# Patient Record
Sex: MALE | Race: WHITE | NOT HISPANIC OR LATINO | Employment: FULL TIME | URBAN - METROPOLITAN AREA
[De-identification: names, ages, dates, MRNs, and addresses within clinical notes are randomized per-mention and may not be internally consistent; named-entity substitution may affect disease eponyms.]

---

## 2023-08-01 ENCOUNTER — OFFICE VISIT (OUTPATIENT)
Dept: FAMILY MEDICINE CLINIC | Facility: CLINIC | Age: 33
End: 2023-08-01
Payer: COMMERCIAL

## 2023-08-01 VITALS
DIASTOLIC BLOOD PRESSURE: 90 MMHG | BODY MASS INDEX: 27.62 KG/M2 | OXYGEN SATURATION: 97 % | TEMPERATURE: 97.5 F | RESPIRATION RATE: 16 BRPM | HEIGHT: 74 IN | WEIGHT: 215.2 LBS | SYSTOLIC BLOOD PRESSURE: 140 MMHG | HEART RATE: 72 BPM

## 2023-08-01 DIAGNOSIS — F32.1 CURRENT MODERATE EPISODE OF MAJOR DEPRESSIVE DISORDER, UNSPECIFIED WHETHER RECURRENT (HCC): ICD-10-CM

## 2023-08-01 DIAGNOSIS — I10 ESSENTIAL HYPERTENSION: ICD-10-CM

## 2023-08-01 DIAGNOSIS — F90.0 ATTENTION DEFICIT HYPERACTIVITY DISORDER (ADHD), PREDOMINANTLY INATTENTIVE TYPE: ICD-10-CM

## 2023-08-01 DIAGNOSIS — Z78.9 VEGETARIANISM: ICD-10-CM

## 2023-08-01 DIAGNOSIS — L70.0 ACNE VULGARIS: ICD-10-CM

## 2023-08-01 DIAGNOSIS — Z00.00 PHYSICAL EXAM, ANNUAL: Primary | ICD-10-CM

## 2023-08-01 DIAGNOSIS — E55.9 VITAMIN D DEFICIENCY: ICD-10-CM

## 2023-08-01 PROCEDURE — 99214 OFFICE O/P EST MOD 30 MIN: CPT | Performed by: FAMILY MEDICINE

## 2023-08-01 PROCEDURE — 99385 PREV VISIT NEW AGE 18-39: CPT | Performed by: FAMILY MEDICINE

## 2023-08-01 RX ORDER — TRAMADOL HYDROCHLORIDE 50 MG/1
TABLET ORAL AS NEEDED
COMMUNITY

## 2023-08-01 RX ORDER — MULTIVIT-MIN/IRON/FOLIC ACID/K 18-600-40
2000 CAPSULE ORAL DAILY
COMMUNITY

## 2023-08-01 RX ORDER — AMLODIPINE BESYLATE 10 MG/1
10 TABLET ORAL DAILY
Qty: 30 TABLET | Refills: 3 | Status: SHIPPED | OUTPATIENT
Start: 2023-08-01

## 2023-08-01 RX ORDER — AMLODIPINE BESYLATE 5 MG/1
5 TABLET ORAL
COMMUNITY
Start: 2023-02-28 | End: 2023-08-01

## 2023-08-01 RX ORDER — RIZATRIPTAN BENZOATE 10 MG/1
10 TABLET ORAL
COMMUNITY
Start: 2023-05-10

## 2023-08-01 RX ORDER — VENLAFAXINE 37.5 MG/1
1 TABLET ORAL DAILY
COMMUNITY
Start: 2016-01-14

## 2023-08-01 RX ORDER — VENLAFAXINE HYDROCHLORIDE 75 MG/1
75 CAPSULE, EXTENDED RELEASE ORAL
Qty: 30 CAPSULE | Refills: 5 | Status: SHIPPED | OUTPATIENT
Start: 2023-08-01

## 2023-08-01 RX ORDER — ACETAMINOPHEN 500 MG
500 TABLET ORAL EVERY 6 HOURS PRN
COMMUNITY

## 2023-08-01 RX ORDER — MULTIVITAMIN WITH IRON
250 TABLET ORAL DAILY
COMMUNITY

## 2023-08-01 RX ORDER — DEXTROAMPHETAMINE SACCHARATE, AMPHETAMINE ASPARTATE MONOHYDRATE, DEXTROAMPHETAMINE SULFATE AND AMPHETAMINE SULFATE 2.5; 2.5; 2.5; 2.5 MG/1; MG/1; MG/1; MG/1
10 CAPSULE, EXTENDED RELEASE ORAL EVERY MORNING
Qty: 30 CAPSULE | Refills: 0 | Status: SHIPPED | OUTPATIENT
Start: 2023-08-01

## 2023-08-01 RX ORDER — EPINEPHRINE 0.3 MG/.3ML
INJECTION SUBCUTANEOUS
COMMUNITY
Start: 2023-06-01

## 2023-08-01 RX ORDER — LANOLIN ALCOHOL/MO/W.PET/CERES
3 CREAM (GRAM) TOPICAL
COMMUNITY

## 2023-08-01 NOTE — PATIENT INSTRUCTIONS
Increase to effexor 75mg daily  After 3 weeks on effexor, start adderall daily  F/u in 6 weeks  Obtain fasting labs  Increase norvasc to 10mg daily

## 2023-08-01 NOTE — PROGRESS NOTES
Name: Jean Garcia      : 1990      MRN: [id number]  Encounter Provider: Agueda Alba DO  Encounter Date: 2023   Encounter department: [facility]     1. Physical exam, annual  Comments:  check fasting labs  continue healthy diet and exercise    Orders:  -     TSH, 3rd generation; Future  -     Comprehensive metabolic panel; Future  -     CBC and differential; Future  -     Lipid panel; Future    2. Current moderate episode of major depressive disorder, unspecified whether recurrent ([address])  Comments:  increase effexor to 75mg daily  f/u 6 weeks  Orders:  -     venlafaxine (EFFEXOR-XR) 75 mg 24 hr capsule; Take 1 capsule (75 mg total) by mouth daily with breakfast    3. Attention deficit hyperactivity disorder (ADHD), predominantly inattentive type  Comments:  start effexor as above and make sure he is tolerating. if so, after 3 weeks, may start adderall xr 10mg daily  f/u 6 weeks  Orders:  -     amphetamine-dextroamphetamine (ADDERALL XR, 10MG,) 10 MG 24 hr capsule; Take 1 capsule (10 mg total) by mouth every morning Max Daily Amount: 10 mg    4. Vegetarianism  -     Vitamin B12; Future  -     Ferritin; Future    5. Essential hypertension  Comments:  above goal  increase norvasc to 10mg daily    Orders:  -     amLODIPine (NORVASC) 10 mg tablet; Take 1 tablet (10 mg total) by mouth daily    6. Acne vulgaris  -     tretinoin (RETIN-A) 0.025 % cream; Apply topically daily at bedtime    7. Vitamin D deficiency  -     Vitamin D 25 hydroxy; Future      BMI Counseling: Body mass index is 27.63 kg/m². The BMI is above normal. Nutrition recommendations include encouraging healthy choices of fruits and vegetables. Exercise recommendations include exercising 3-5 times per week. Rationale for BMI follow-up plan is due to patient being overweight or obese. Subjective      HPI   Pt presents as new pt (reestablish after years) for physical and with concerns. Preventively, pt due for labs. He is exercising daily--weights and cardio. Seeing dentist.  Up to date on shots. vegetarian    From a problem standpoint, pt has several concerns:    Had L ear effusion. Non-infected. Had been taking zyrtec and flonase and recently added astelin from the allergist.  Would like to know if this resolved. No ear pain or hearing loss. ADHD/depression/anxiety/sleep  Diagnosed at adhd at a young age. Daughter has been recently diagnosed. Stopped taking adderall at age 19 because he didn't want to take it. He notes he is impatient and unable to focus. Difficulty focusing at work. Has been very depressed recently. Sees psychology every week. Feels low motivation, low energy, existential dread, anhedonia. No si/hi.  + anxiety. Notes everything stresses him out way more than it used to.  + Overwhelmedness. Makes lists in case he forgets something because he's afraid things will slip between the cracks. Rare panic. Has been having a lot of trouble obtaining sleep. Taking magnesium at night which is helpful. Melatonin was initially helpful, but he needed increasing doses. Now decreasing back. Using cpap, but hasn't been able to fall asleep with it on. On remeron which may help. Using effexor for migraines. Has helped tremendously. Getting about 2 per month. Pt has hx of HTN. Blood pressure borderline and usually above goal over the last year or so per pt. High today. No chest pain, shortness of breath, n/v, abd pain, visual changes, paresthesias, weakness. Tolerating norvasc. No LE edema    Pt noticing more acne on his face. Would like to try retin a which he used as a child. Review of Systems   Constitutional: Negative for chills, fatigue, fever and unexpected weight change. HENT: Negative for congestion, ear pain, hearing loss, postnasal drip, rhinorrhea, sinus pressure, sinus pain, sore throat, trouble swallowing and voice change.     Eyes: Negative for pain, redness and visual disturbance. Respiratory: Negative for cough and shortness of breath. Cardiovascular: Negative for chest pain, palpitations and leg swelling. Gastrointestinal: Negative for abdominal pain, constipation, diarrhea and nausea. Endocrine: Negative for cold intolerance, heat intolerance, polydipsia and polyuria. Genitourinary: Negative for dysuria, frequency and urgency. Musculoskeletal: Positive for arthralgias. Negative for joint swelling and myalgias. Chronic wrist pain s/p injury   Skin: Negative for rash. No suspicious lesions   Neurological: Negative for weakness, numbness and headaches. Hematological: Negative for adenopathy. Psychiatric/Behavioral: Positive for agitation, decreased concentration, dysphoric mood and sleep disturbance. Negative for suicidal ideas. The patient is nervous/anxious.         Current Outpatient Medications on File Prior to Visit   Medication Sig   • Lidocaine-Menthol 4-5 % PTCH Apply topically if needed   • Magnesium 250 MG TABS Take 250 mg by mouth in the morning   • rizatriptan (MAXALT) 10 mg tablet Take 10 mg by mouth   • venlafaxine (EFFEXOR) 37.5 mg tablet Take 1 tablet by mouth daily   • [DISCONTINUED] amLODIPine (NORVASC) 5 mg tablet Take 5 mg by mouth   • [DISCONTINUED] Multiple Vitamin (MULTIVITAMIN PO) Take by mouth   • acetaminophen (TYLENOL) 500 mg tablet Take 500 mg by mouth every 6 (six) hours as needed   • Cholecalciferol (Vitamin D) 50 MCG (2000 UT) CAPS Take 2,000 Units by mouth daily   • EPINEPHrine (EPIPEN) 0.3 mg/0.3 mL SOAJ    • melatonin 3 mg Take 3 mg by mouth   • Multiple Vitamin (MULTIVITAMIN PO) Take 1 tablet by mouth every morning   • Omega-3 Fatty Acids (OMEGA 3 PO) Take 650 mg by mouth daily   • Probiotic Product (PROBIOTIC PO) Take by mouth daily   • traMADol (ULTRAM) 50 mg tablet Take by mouth if needed for pain       Objective     /90 (BP Location: Right arm, Patient Position: Sitting, Cuff Size: Standard)   Pulse 72   Temp 97.5 °F (36.4 °C) (Temporal)   Resp 16   Ht 6' 2" (1.88 m)   Wt 97.6 kg (215 lb 3.2 oz)   SpO2 97%   BMI 27.63 kg/m²     Physical Exam  Constitutional:       General: He is not in acute distress. Appearance: He is well-developed. HENT:      Head: Normocephalic and atraumatic. Right Ear: Tympanic membrane, ear canal and external ear normal.      Left Ear: Tympanic membrane, ear canal and external ear normal.      Nose: Nose normal.      Mouth/Throat:      Pharynx: No oropharyngeal exudate. Eyes:      Conjunctiva/sclera: Conjunctivae normal.      Pupils: Pupils are equal, round, and reactive to light. Neck:      Thyroid: No thyromegaly. Vascular: No carotid bruit or JVD. Cardiovascular:      Rate and Rhythm: Regular rhythm. Heart sounds: S1 normal and S2 normal. No murmur heard. No friction rub. No gallop. No S3 or S4 sounds. Pulmonary:      Effort: Pulmonary effort is normal.      Breath sounds: Normal breath sounds. No wheezing, rhonchi or rales. Abdominal:      General: Bowel sounds are normal. There is no distension. Palpations: Abdomen is soft. Tenderness: There is no abdominal tenderness. Lymphadenopathy:      Cervical: No cervical adenopathy. Neurological:      Mental Status: He is alert and oriented to person, place, and time. Cranial Nerves: No cranial nerve deficit. Sensory: No sensory deficit. Deep Tendon Reflexes: Reflexes are normal and symmetric. Psychiatric:         Attention and Perception: Attention normal.         Mood and Affect: Mood is depressed. Speech: Speech normal.         Behavior: Behavior normal.         Thought Content:  Thought content normal.         Cognition and Memory: Cognition normal.         Judgment: Judgment normal.       Darren Mathis,

## 2023-08-28 DIAGNOSIS — F90.0 ATTENTION DEFICIT HYPERACTIVITY DISORDER (ADHD), PREDOMINANTLY INATTENTIVE TYPE: Primary | ICD-10-CM

## 2023-08-28 RX ORDER — DEXTROAMPHETAMINE SACCHARATE, AMPHETAMINE ASPARTATE MONOHYDRATE, DEXTROAMPHETAMINE SULFATE AND AMPHETAMINE SULFATE 3.75; 3.75; 3.75; 3.75 MG/1; MG/1; MG/1; MG/1
15 CAPSULE, EXTENDED RELEASE ORAL EVERY MORNING
Qty: 30 CAPSULE | Refills: 0 | Status: SHIPPED | OUTPATIENT
Start: 2023-08-28

## 2023-09-12 ENCOUNTER — OFFICE VISIT (OUTPATIENT)
Dept: FAMILY MEDICINE CLINIC | Facility: CLINIC | Age: 33
End: 2023-09-12
Payer: COMMERCIAL

## 2023-09-12 VITALS
SYSTOLIC BLOOD PRESSURE: 128 MMHG | HEIGHT: 74 IN | BODY MASS INDEX: 27.98 KG/M2 | DIASTOLIC BLOOD PRESSURE: 96 MMHG | RESPIRATION RATE: 16 BRPM | OXYGEN SATURATION: 98 % | HEART RATE: 88 BPM | WEIGHT: 218 LBS | TEMPERATURE: 97.5 F

## 2023-09-12 DIAGNOSIS — M54.50 LOW BACK PAIN, UNSPECIFIED BACK PAIN LATERALITY, UNSPECIFIED CHRONICITY, UNSPECIFIED WHETHER SCIATICA PRESENT: ICD-10-CM

## 2023-09-12 DIAGNOSIS — F32.1 CURRENT MODERATE EPISODE OF MAJOR DEPRESSIVE DISORDER, UNSPECIFIED WHETHER RECURRENT (HCC): ICD-10-CM

## 2023-09-12 DIAGNOSIS — G43.009 MIGRAINE WITHOUT AURA AND WITHOUT STATUS MIGRAINOSUS, NOT INTRACTABLE: ICD-10-CM

## 2023-09-12 DIAGNOSIS — F90.0 ATTENTION DEFICIT HYPERACTIVITY DISORDER (ADHD), PREDOMINANTLY INATTENTIVE TYPE: Primary | ICD-10-CM

## 2023-09-12 DIAGNOSIS — G47.00 INSOMNIA, UNSPECIFIED TYPE: ICD-10-CM

## 2023-09-12 DIAGNOSIS — I10 ESSENTIAL HYPERTENSION: ICD-10-CM

## 2023-09-12 PROCEDURE — 99214 OFFICE O/P EST MOD 30 MIN: CPT | Performed by: FAMILY MEDICINE

## 2023-09-12 RX ORDER — DEXTROAMPHETAMINE SACCHARATE, AMPHETAMINE ASPARTATE MONOHYDRATE, DEXTROAMPHETAMINE SULFATE AND AMPHETAMINE SULFATE 5; 5; 5; 5 MG/1; MG/1; MG/1; MG/1
20 CAPSULE, EXTENDED RELEASE ORAL EVERY MORNING
Qty: 30 CAPSULE | Refills: 0 | Status: SHIPPED | OUTPATIENT
Start: 2023-09-12

## 2023-09-12 RX ORDER — AMLODIPINE BESYLATE 5 MG/1
5 TABLET ORAL DAILY
Qty: 90 TABLET | Refills: 1 | Status: SHIPPED | OUTPATIENT
Start: 2023-09-12

## 2023-09-12 RX ORDER — MIRTAZAPINE 7.5 MG/1
7.5 TABLET, FILM COATED ORAL
Qty: 90 TABLET | Refills: 1 | Status: SHIPPED | OUTPATIENT
Start: 2023-09-12

## 2023-09-12 RX ORDER — INDOMETHACIN 25 MG/1
25 CAPSULE ORAL DAILY PRN
COMMUNITY
Start: 2023-05-23 | End: 2024-05-22

## 2023-09-12 RX ORDER — LISINOPRIL 10 MG/1
10 TABLET ORAL DAILY
Qty: 30 TABLET | Refills: 3 | Status: SHIPPED | OUTPATIENT
Start: 2023-09-12

## 2023-09-12 RX ORDER — TRAMADOL HYDROCHLORIDE 50 MG/1
50 TABLET ORAL EVERY 8 HOURS PRN
Qty: 30 TABLET | Refills: 0 | Status: SHIPPED | OUTPATIENT
Start: 2023-09-12

## 2023-09-12 RX ORDER — VENLAFAXINE HYDROCHLORIDE 75 MG/1
75 CAPSULE, EXTENDED RELEASE ORAL
Qty: 90 CAPSULE | Refills: 1 | Status: SHIPPED | OUTPATIENT
Start: 2023-09-12

## 2023-09-12 RX ORDER — RIZATRIPTAN BENZOATE 10 MG/1
10 TABLET ORAL AS NEEDED
Qty: 30 TABLET | Refills: 1 | Status: SHIPPED | OUTPATIENT
Start: 2023-09-12

## 2023-09-12 NOTE — PROGRESS NOTES
Name: Yasmin Al      : 1990      MRN: [id number]  Encounter Provider: Gerhardt Cockayne, DO  Encounter Date: 2023   Encounter department: [facility]     1. Attention deficit hyperactivity disorder (ADHD), predominantly inattentive type  Comments:  increase adderall to 20mg daily  f/u 1 mo  Orders:  -     amphetamine-dextroamphetamine (ADDERALL XR, 20MG,) 20 MG 24 hr capsule; Take 1 capsule (20 mg total) by mouth every morning Max Daily Amount: 20 mg    2. Essential hypertension  Comments:  decrease norvasc to 5mg due to LE edema  start lisinopril 10mg daily  f/u 1 mo  Orders:  -     lisinopril (ZESTRIL) 10 mg tablet; Take 1 tablet (10 mg total) by mouth daily  -     amLODIPine (NORVASC) 5 mg tablet; Take 1 tablet (5 mg total) by mouth daily    3. Current moderate episode of major depressive disorder, unspecified whether recurrent ([address])  Comments:  suspect he'd do better with increasing effexor, but will hold for now while we adjust other meds  Orders:  -     venlafaxine (EFFEXOR-XR) 75 mg 24 hr capsule; Take 1 capsule (75 mg total) by mouth daily with breakfast    4. Migraine without aura and without status migrainosus, not intractable  Comments:  maxalt refilled  no role for prophy  Orders:  -     rizatriptan (MAXALT) 10 mg tablet; Take 1 tablet (10 mg total) by mouth as needed for migraine    5. Insomnia, unspecified type  Comments:  remeron at night  Orders:  -     mirtazapine (REMERON) 7.5 MG tablet; Take 1 tablet (7.5 mg total) by mouth daily at bedtime    6. Low back pain, unspecified back pain laterality, unspecified chronicity, unspecified whether sciatica present  Comments:  longstanding  uses ultram sparingly  may continue same  Orders:  -     traMADol (ULTRAM) 50 mg tablet;  Take 1 tablet (50 mg total) by mouth every 8 (eight) hours as needed for severe pain        Depression Screening and Follow-up Plan: Patient's depression screening was positive with a PHQ-2 score of 4. Their PHQ-9 score was 11. Patient assessed for underlying major depression. Brief counseling provided and recommend additional follow-up/re-evaluation next office visit. Subjective      HPI   Pt presents in f/u. SBP improved today. Pt had LE edema on norvasc 10mg and has been using compression stockings. No chest pain, shortness of breath. Attn better on adderall 15mg but still fidgeting a lot and can't fully concentrate. Lasting most of the day. Energy/motivation still poor. Still having anxiety but both issues better in increased effexor. Mind racing at night, so sleep isn't optimal    Needs refill of maxalt. Uses this less than 4x per month. Always works. Migraine is pulsating behind eye and feels better when eye is closed. No associated n/v, light/sound sensitivity. No paresthesias or weakness. Review of Systems   Constitutional: Negative for chills, fatigue, fever and unexpected weight change. HENT: Negative for congestion, ear pain, hearing loss, postnasal drip, rhinorrhea, sinus pressure, sinus pain, sore throat, trouble swallowing and voice change. Eyes: Negative for pain, redness and visual disturbance. Respiratory: Negative for cough and shortness of breath. Cardiovascular: Positive for leg swelling. Negative for chest pain and palpitations. Gastrointestinal: Negative for abdominal pain, constipation, diarrhea and nausea. Endocrine: Negative for cold intolerance, heat intolerance, polydipsia and polyuria. Genitourinary: Negative for dysuria, frequency and urgency. Musculoskeletal: Negative for arthralgias, joint swelling and myalgias. Skin: Negative for rash. No suspicious lesions   Neurological: Negative for weakness, numbness and headaches. Hematological: Negative for adenopathy. Psychiatric/Behavioral: Positive for decreased concentration and dysphoric mood. The patient is nervous/anxious.         Current Outpatient Medications on File Prior to Visit   Medication Sig   • Cholecalciferol (Vitamin D) 50 MCG (2000 UT) CAPS Take 2,000 Units by mouth daily   • EPINEPHrine (EPIPEN) 0.3 mg/0.3 mL SOAJ    • indomethacin (INDOCIN) 25 mg capsule Take 25 mg by mouth daily as needed   • Lidocaine-Menthol 4-5 % PTCH Apply topically if needed   • Magnesium 250 MG TABS Take 250 mg by mouth in the morning   • melatonin 3 mg Take 3 mg by mouth   • Multiple Vitamin (MULTIVITAMIN PO) Take 1 tablet by mouth every morning   • Omega-3 Fatty Acids (OMEGA 3 PO) Take 650 mg by mouth daily   • Probiotic Product (PROBIOTIC PO) Take by mouth daily   • tretinoin (RETIN-A) 0.025 % cream Apply topically daily at bedtime   • [DISCONTINUED] amLODIPine (NORVASC) 10 mg tablet Take 1 tablet (10 mg total) by mouth daily   • [DISCONTINUED] amphetamine-dextroamphetamine (ADDERALL XR, 15MG,) 15 MG 24 hr capsule Take 1 capsule (15 mg total) by mouth every morning Max Daily Amount: 15 mg   • [DISCONTINUED] rizatriptan (MAXALT) 10 mg tablet Take 10 mg by mouth   • [DISCONTINUED] traMADol (ULTRAM) 50 mg tablet Take by mouth if needed for pain   • [DISCONTINUED] venlafaxine (EFFEXOR-XR) 75 mg 24 hr capsule Take 1 capsule (75 mg total) by mouth daily with breakfast   • [DISCONTINUED] acetaminophen (TYLENOL) 500 mg tablet Take 500 mg by mouth every 6 (six) hours as needed   • [DISCONTINUED] venlafaxine (EFFEXOR) 37.5 mg tablet Take 1 tablet by mouth daily (Patient not taking: Reported on 9/12/2023)       Objective     /96 (BP Location: Left arm, Patient Position: Sitting, Cuff Size: Standard)   Pulse 88   Temp 97.5 °F (36.4 °C) (Temporal)   Resp 16   Ht 6' 2" (1.88 m)   Wt 98.9 kg (218 lb)   SpO2 98%   BMI 27.99 kg/m²     Physical Exam  Constitutional:       General: He is not in acute distress. Appearance: He is well-developed. HENT:      Head: Normocephalic and atraumatic.       Right Ear: Tympanic membrane, ear canal and external ear normal. Left Ear: Tympanic membrane, ear canal and external ear normal.      Nose: Nose normal.      Mouth/Throat:      Pharynx: No oropharyngeal exudate. Eyes:      Conjunctiva/sclera: Conjunctivae normal.      Pupils: Pupils are equal, round, and reactive to light. Neck:      Thyroid: No thyromegaly. Vascular: No carotid bruit or JVD. Cardiovascular:      Rate and Rhythm: Regular rhythm. Heart sounds: S1 normal and S2 normal. No murmur heard. No friction rub. No gallop. No S3 or S4 sounds. Pulmonary:      Effort: Pulmonary effort is normal.      Breath sounds: Normal breath sounds. No wheezing, rhonchi or rales. Abdominal:      General: Bowel sounds are normal. There is no distension. Palpations: Abdomen is soft. Tenderness: There is no abdominal tenderness. Lymphadenopathy:      Cervical: No cervical adenopathy. Neurological:      Mental Status: He is alert and oriented to person, place, and time. Cranial Nerves: No cranial nerve deficit. Sensory: No sensory deficit. Deep Tendon Reflexes: Reflexes are normal and symmetric. Psychiatric:         Attention and Perception: Attention normal.         Mood and Affect: Mood is depressed. Speech: Speech normal.         Behavior: Behavior normal.         Thought Content:  Thought content normal.         Cognition and Memory: Cognition normal.         Judgment: Judgment normal.       Lila Grave, DO

## 2023-10-10 ENCOUNTER — OFFICE VISIT (OUTPATIENT)
Dept: FAMILY MEDICINE CLINIC | Facility: CLINIC | Age: 33
End: 2023-10-10
Payer: COMMERCIAL

## 2023-10-10 VITALS
DIASTOLIC BLOOD PRESSURE: 96 MMHG | HEIGHT: 74 IN | TEMPERATURE: 98 F | BODY MASS INDEX: 27.85 KG/M2 | HEART RATE: 94 BPM | RESPIRATION RATE: 16 BRPM | OXYGEN SATURATION: 100 % | SYSTOLIC BLOOD PRESSURE: 154 MMHG | WEIGHT: 217 LBS

## 2023-10-10 DIAGNOSIS — F32.1 CURRENT MODERATE EPISODE OF MAJOR DEPRESSIVE DISORDER, UNSPECIFIED WHETHER RECURRENT (HCC): ICD-10-CM

## 2023-10-10 DIAGNOSIS — F90.0 ATTENTION DEFICIT HYPERACTIVITY DISORDER (ADHD), PREDOMINANTLY INATTENTIVE TYPE: Primary | ICD-10-CM

## 2023-10-10 DIAGNOSIS — I10 ESSENTIAL HYPERTENSION: ICD-10-CM

## 2023-10-10 DIAGNOSIS — Z23 NEED FOR IMMUNIZATION AGAINST INFLUENZA: ICD-10-CM

## 2023-10-10 PROCEDURE — 90686 IIV4 VACC NO PRSV 0.5 ML IM: CPT

## 2023-10-10 PROCEDURE — 99214 OFFICE O/P EST MOD 30 MIN: CPT | Performed by: FAMILY MEDICINE

## 2023-10-10 PROCEDURE — 90471 IMMUNIZATION ADMIN: CPT

## 2023-10-10 RX ORDER — DEXTROAMPHETAMINE SACCHARATE, AMPHETAMINE ASPARTATE MONOHYDRATE, DEXTROAMPHETAMINE SULFATE AND AMPHETAMINE SULFATE 5; 5; 5; 5 MG/1; MG/1; MG/1; MG/1
20 CAPSULE, EXTENDED RELEASE ORAL EVERY MORNING
Qty: 90 CAPSULE | Refills: 0 | Status: SHIPPED | OUTPATIENT
Start: 2023-10-10 | End: 2023-10-12 | Stop reason: SDUPTHER

## 2023-10-10 RX ORDER — VENLAFAXINE HYDROCHLORIDE 150 MG/1
150 CAPSULE, EXTENDED RELEASE ORAL
Qty: 30 CAPSULE | Refills: 5 | Status: SHIPPED | OUTPATIENT
Start: 2023-10-10

## 2023-10-10 RX ORDER — AMLODIPINE BESYLATE 5 MG/1
5 TABLET ORAL DAILY
Qty: 90 TABLET | Refills: 1 | Status: SHIPPED | OUTPATIENT
Start: 2023-10-10

## 2023-10-10 NOTE — PROGRESS NOTES
Name: Daquan Guthrie      : 1990      MRN: [id number]  Encounter Provider: Ana Narvaez DO  Encounter Date: 10/10/2023   Encounter department: [facility]     1. Attention deficit hyperactivity disorder (ADHD), predominantly inattentive type  Comments:  optimized on 20mg daily  continue same  suspect lightheadedness corresponds to adderall increase and only occurred when doing squats  for now, will do leg days and then take the meds after that and see if sx resolve  Orders:  -     amphetamine-dextroamphetamine (ADDERALL XR, 20MG,) 20 MG 24 hr capsule; Take 1 capsule (20 mg total) by mouth every morning Max Daily Amount: 20 mg    2. Current moderate episode of major depressive disorder, unspecified whether recurrent ([address])  Comments:  low mood/energy still  increase effexor to 150mg daily  f/u  1mo  Orders:  -     venlafaxine (EFFEXOR-XR) 150 mg 24 hr capsule; Take 1 capsule (150 mg total) by mouth daily with breakfast    3. Essential hypertension  Comments:  do not think the lisiopril was the cause of lightheadedness  restart at 10mg daily  continue norvasc 5mg   f/u 1 mo  Orders:  -     amLODIPine (NORVASC) 5 mg tablet; Take 1 tablet (5 mg total) by mouth daily           Subjective      HPI   Pt presents in f/u for add and HTN. Stopped lisinopril because he was getting lightheaded with position change. Blood pressure now high and lightheadedness has remained. Looking back, he notes that sx corresponded to the increase in adderall dose and it was only happening on days where he was doing squats. He would like to keep the adderall at current dosing and take it after exercise to see if sx resolve. No chest pain, shortness of breath, palps. In terms of concentration, adderall is working well. Feels like he can focus all day. No chest pain, palps.   No difficulty obtaining sleep    Mood has been fairly poor this month, though a little better with the adderall optimized. Would like to increase effexor          Review of Systems   Constitutional: Negative for chills, fatigue, fever and unexpected weight change. HENT: Negative for congestion, ear pain, hearing loss, postnasal drip, rhinorrhea, sinus pressure, sinus pain, sore throat, trouble swallowing and voice change. Eyes: Negative for pain, redness and visual disturbance. Respiratory: Negative for cough and shortness of breath. Cardiovascular: Negative for chest pain, palpitations and leg swelling. Gastrointestinal: Negative for abdominal pain, constipation, diarrhea and nausea. Endocrine: Negative for cold intolerance, heat intolerance, polydipsia and polyuria. Genitourinary: Negative for dysuria, frequency and urgency. Musculoskeletal: Negative for arthralgias, joint swelling and myalgias. Skin: Negative for rash. No suspicious lesions   Neurological: Positive for light-headedness. Negative for weakness, numbness and headaches. Hematological: Negative for adenopathy.        Current Outpatient Medications on File Prior to Visit   Medication Sig   • Cholecalciferol (Vitamin D) 50 MCG (2000 UT) CAPS Take 2,000 Units by mouth daily   • EPINEPHrine (EPIPEN) 0.3 mg/0.3 mL SOAJ    • indomethacin (INDOCIN) 25 mg capsule Take 25 mg by mouth daily as needed   • Lidocaine-Menthol 4-5 % PTCH Apply topically if needed   • lisinopril (ZESTRIL) 10 mg tablet Take 1 tablet (10 mg total) by mouth daily   • Magnesium 250 MG TABS Take 250 mg by mouth in the morning   • melatonin 3 mg Take 3 mg by mouth   • mirtazapine (REMERON) 7.5 MG tablet Take 1 tablet (7.5 mg total) by mouth daily at bedtime   • Multiple Vitamin (MULTIVITAMIN PO) Take 1 tablet by mouth every morning   • Omega-3 Fatty Acids (OMEGA 3 PO) Take 650 mg by mouth daily   • Probiotic Product (PROBIOTIC PO) Take by mouth daily   • rizatriptan (MAXALT) 10 mg tablet Take 1 tablet (10 mg total) by mouth as needed for migraine   • traMADol (ULTRAM) 50 mg tablet Take 1 tablet (50 mg total) by mouth every 8 (eight) hours as needed for severe pain   • tretinoin (RETIN-A) 0.025 % cream Apply topically daily at bedtime   • [DISCONTINUED] amLODIPine (NORVASC) 5 mg tablet Take 1 tablet (5 mg total) by mouth daily   • [DISCONTINUED] amphetamine-dextroamphetamine (ADDERALL XR, 20MG,) 20 MG 24 hr capsule Take 1 capsule (20 mg total) by mouth every morning Max Daily Amount: 20 mg   • [DISCONTINUED] venlafaxine (EFFEXOR-XR) 75 mg 24 hr capsule Take 1 capsule (75 mg total) by mouth daily with breakfast       Objective     /96   Pulse 94   Temp 98 °F (36.7 °C) (Temporal)   Resp 16   Ht 6' 2" (1.88 m)   Wt 98.4 kg (217 lb)   SpO2 100%   BMI 27.86 kg/m²     Physical Exam  Constitutional:       General: He is not in acute distress. Appearance: He is well-developed. HENT:      Head: Normocephalic and atraumatic. Right Ear: External ear normal.      Left Ear: External ear normal.      Nose: Nose normal.      Mouth/Throat:      Pharynx: No oropharyngeal exudate. Eyes:      Conjunctiva/sclera: Conjunctivae normal.      Pupils: Pupils are equal, round, and reactive to light. Neck:      Thyroid: No thyromegaly. Vascular: No carotid bruit or JVD. Cardiovascular:      Rate and Rhythm: Regular rhythm. Heart sounds: S1 normal and S2 normal. No murmur heard. No friction rub. No gallop. No S3 or S4 sounds. Pulmonary:      Effort: Pulmonary effort is normal.      Breath sounds: Normal breath sounds. No wheezing, rhonchi or rales. Abdominal:      General: Bowel sounds are normal. There is no distension. Palpations: Abdomen is soft. Tenderness: There is no abdominal tenderness. Lymphadenopathy:      Cervical: No cervical adenopathy. Neurological:      Mental Status: He is alert and oriented to person, place, and time. Cranial Nerves: No cranial nerve deficit. Sensory: No sensory deficit.       Deep Tendon Reflexes: Reflexes are normal and symmetric. Psychiatric:         Attention and Perception: Attention normal.         Mood and Affect: Mood is depressed. Speech: Speech normal.         Behavior: Behavior normal.         Thought Content:  Thought content normal.         Cognition and Memory: Cognition normal.         Judgment: Judgment normal.       Ryan Hidden, DO

## 2023-10-12 DIAGNOSIS — F90.0 ATTENTION DEFICIT HYPERACTIVITY DISORDER (ADHD), PREDOMINANTLY INATTENTIVE TYPE: ICD-10-CM

## 2023-10-12 NOTE — TELEPHONE ENCOUNTER
Medication Refill Request     Name amphetamine-dextroamphetamine (ADDERALL XR, 20MG,) 20 MG 24 hr capsule    Dose/Frequency Take 1 capsule (20 mg total) by mouth every morning   Quantity 90  Verified pharmacy   [x]  Verified ordering Provider   [x]  Does patient have enough for the next 3 days? Yes [] No [x]    Mail order is out of stock - needs local pharmacy to fill.

## 2023-10-13 RX ORDER — DEXTROAMPHETAMINE SACCHARATE, AMPHETAMINE ASPARTATE MONOHYDRATE, DEXTROAMPHETAMINE SULFATE AND AMPHETAMINE SULFATE 5; 5; 5; 5 MG/1; MG/1; MG/1; MG/1
20 CAPSULE, EXTENDED RELEASE ORAL EVERY MORNING
Qty: 90 CAPSULE | Refills: 0 | Status: SHIPPED | OUTPATIENT
Start: 2023-10-13

## 2023-11-07 ENCOUNTER — OFFICE VISIT (OUTPATIENT)
Dept: FAMILY MEDICINE CLINIC | Facility: CLINIC | Age: 33
End: 2023-11-07
Payer: COMMERCIAL

## 2023-11-07 VITALS
BODY MASS INDEX: 27.9 KG/M2 | RESPIRATION RATE: 18 BRPM | TEMPERATURE: 97.3 F | DIASTOLIC BLOOD PRESSURE: 84 MMHG | HEART RATE: 78 BPM | SYSTOLIC BLOOD PRESSURE: 132 MMHG | HEIGHT: 74 IN | OXYGEN SATURATION: 99 % | WEIGHT: 217.4 LBS

## 2023-11-07 DIAGNOSIS — F32.1 CURRENT MODERATE EPISODE OF MAJOR DEPRESSIVE DISORDER, UNSPECIFIED WHETHER RECURRENT (HCC): Primary | ICD-10-CM

## 2023-11-07 PROCEDURE — 99213 OFFICE O/P EST LOW 20 MIN: CPT | Performed by: FAMILY MEDICINE

## 2023-11-07 RX ORDER — VENLAFAXINE HYDROCHLORIDE 75 MG/1
75 CAPSULE, EXTENDED RELEASE ORAL
Qty: 30 CAPSULE | Refills: 5 | Status: SHIPPED | OUTPATIENT
Start: 2023-11-07

## 2023-11-07 RX ORDER — VENLAFAXINE HYDROCHLORIDE 75 MG/1
75 CAPSULE, EXTENDED RELEASE ORAL
Qty: 90 CAPSULE | Refills: 1 | Status: SHIPPED | OUTPATIENT
Start: 2023-11-07

## 2023-11-07 NOTE — PATIENT INSTRUCTIONS
Look into insurance coverage of genesite testing.   Dial back effexor to 75mg   Message me in a month with an update  Refer to psychiatry

## 2023-11-07 NOTE — PROGRESS NOTES
Name: Milton Salter      : 1990      MRN: [id number]  Encounter Provider: Chandra Hardy DO  Encounter Date: 2023   Encounter department: [address]     1. Current moderate episode of major depressive disorder, unspecified whether recurrent ([address])  Comments:  worse with effexor increase  decrease effexor back to 75mg   consult psychiatry as pt has been on many meds over the years  check on coverage of genesite testing  T/c abilify in the future if he can't get into psych or get genesite testing  Orders:  -     venlafaxine (EFFEXOR-XR) 75 mg 24 hr capsule; Take 1 capsule (75 mg total) by mouth daily with breakfast  -     Ambulatory referral to [employer]; Future  -     venlafaxine (EFFEXOR-XR) 75 mg 24 hr capsule; Take 1 capsule (75 mg total) by mouth daily with breakfast           Subjective      HPI  Pt presents in f/u for mood. Since increasing effexor to 150mg, he is not sleeping well, more depressed. Low energy. Low motivation (stopped working out). No si/hi. Has been on many medications over the years--prozac, lexapro, risperdal, ativan, abilify, seroquel. Many others. Has never really had his depression well-controlled. No si/hi    Pt's focus is appropriate. Tolerating adderall.   Worsening mood occurred with effexor increase, but not with adderall increase prior    Review of Systems  See hpi    Current Outpatient Medications on File Prior to Visit   Medication Sig    amLODIPine (NORVASC) 5 mg tablet Take 1 tablet (5 mg total) by mouth daily    amphetamine-dextroamphetamine (ADDERALL XR, 20MG,) 20 MG 24 hr capsule Take 1 capsule (20 mg total) by mouth every morning Max Daily Amount: 20 mg    Cholecalciferol (Vitamin D) 50 MCG ( UT) CAPS Take 2,000 Units by mouth daily    EPINEPHrine (EPIPEN) 0.3 mg/0.3 mL SOAJ     indomethacin (INDOCIN) 25 mg capsule Take 25 mg by mouth daily as needed    Lidocaine-Menthol 4-5 % PTCH Apply topically if needed    lisinopril (ZESTRIL) 10 mg tablet Take 1 tablet (10 mg total) by mouth daily    Magnesium 250 MG TABS Take 250 mg by mouth in the morning    melatonin 3 mg Take 3 mg by mouth    mirtazapine (REMERON) 7.5 MG tablet Take 1 tablet (7.5 mg total) by mouth daily at bedtime    Multiple Vitamin (MULTIVITAMIN PO) Take 1 tablet by mouth every morning    Omega-3 Fatty Acids (OMEGA 3 PO) Take 650 mg by mouth daily    Probiotic Product (PROBIOTIC PO) Take by mouth daily    rizatriptan (MAXALT) 10 mg tablet Take 1 tablet (10 mg total) by mouth as needed for migraine    traMADol (ULTRAM) 50 mg tablet Take 1 tablet (50 mg total) by mouth every 8 (eight) hours as needed for severe pain    tretinoin (RETIN-A) 0.025 % cream Apply topically daily at bedtime    [DISCONTINUED] venlafaxine (EFFEXOR-XR) 150 mg 24 hr capsule Take 1 capsule (150 mg total) by mouth daily with breakfast       Objective     /84   Pulse 78   Temp (!) 97.3 °F (36.3 °C) (Temporal)   Resp 18   Ht 6' 2" (1.88 m)   Wt 98.6 kg (217 lb 6.4 oz)   SpO2 99%   BMI 27.91 kg/m²     Physical Exam  Constitutional:       Appearance: Normal appearance. Cardiovascular:      Rate and Rhythm: Normal rate and regular rhythm. Pulmonary:      Effort: Pulmonary effort is normal.      Breath sounds: No wheezing, rhonchi or rales. Neurological:      General: No focal deficit present. Mental Status: He is alert and oriented to person, place, and time. Psychiatric:         Attention and Perception: Attention normal.         Mood and Affect: Mood is depressed. Affect is blunt. Speech: Speech normal.         Behavior: Behavior normal. Behavior is cooperative. Thought Content:  Thought content normal.       Damon Hernandez,

## 2023-11-08 ENCOUNTER — PATIENT MESSAGE (OUTPATIENT)
Dept: PSYCHIATRY | Facility: CLINIC | Age: 33
End: 2023-11-08

## 2023-11-08 DIAGNOSIS — I10 ESSENTIAL HYPERTENSION: ICD-10-CM

## 2023-11-08 RX ORDER — LISINOPRIL 10 MG/1
10 TABLET ORAL DAILY
Qty: 90 TABLET | Refills: 1 | Status: SHIPPED | OUTPATIENT
Start: 2023-11-08

## 2023-11-17 DIAGNOSIS — F90.0 ATTENTION DEFICIT HYPERACTIVITY DISORDER (ADHD), PREDOMINANTLY INATTENTIVE TYPE: ICD-10-CM

## 2023-11-17 RX ORDER — DEXTROAMPHETAMINE SACCHARATE, AMPHETAMINE ASPARTATE MONOHYDRATE, DEXTROAMPHETAMINE SULFATE AND AMPHETAMINE SULFATE 5; 5; 5; 5 MG/1; MG/1; MG/1; MG/1
20 CAPSULE, EXTENDED RELEASE ORAL EVERY MORNING
Qty: 90 CAPSULE | Refills: 0 | Status: SHIPPED | OUTPATIENT
Start: 2023-11-17

## 2023-12-04 ENCOUNTER — PATIENT MESSAGE (OUTPATIENT)
Dept: FAMILY MEDICINE CLINIC | Facility: CLINIC | Age: 33
End: 2023-12-04

## 2023-12-04 DIAGNOSIS — F32.1 CURRENT MODERATE EPISODE OF MAJOR DEPRESSIVE DISORDER, UNSPECIFIED WHETHER RECURRENT (HCC): Primary | ICD-10-CM

## 2023-12-05 RX ORDER — VENLAFAXINE HYDROCHLORIDE 37.5 MG/1
37.5 CAPSULE, EXTENDED RELEASE ORAL
Qty: 90 CAPSULE | Refills: 1 | Status: SHIPPED | OUTPATIENT
Start: 2023-12-05

## 2024-01-03 ENCOUNTER — OFFICE VISIT (OUTPATIENT)
Dept: FAMILY MEDICINE CLINIC | Facility: CLINIC | Age: 34
End: 2024-01-03
Payer: COMMERCIAL

## 2024-01-03 VITALS
WEIGHT: 222.6 LBS | SYSTOLIC BLOOD PRESSURE: 132 MMHG | HEIGHT: 74 IN | BODY MASS INDEX: 28.57 KG/M2 | TEMPERATURE: 97.6 F | RESPIRATION RATE: 16 BRPM | OXYGEN SATURATION: 99 % | DIASTOLIC BLOOD PRESSURE: 94 MMHG | HEART RATE: 94 BPM

## 2024-01-03 DIAGNOSIS — G47.33 OSA (OBSTRUCTIVE SLEEP APNEA): ICD-10-CM

## 2024-01-03 DIAGNOSIS — F32.1 CURRENT MODERATE EPISODE OF MAJOR DEPRESSIVE DISORDER, UNSPECIFIED WHETHER RECURRENT (HCC): Primary | ICD-10-CM

## 2024-01-03 PROCEDURE — 99214 OFFICE O/P EST MOD 30 MIN: CPT | Performed by: FAMILY MEDICINE

## 2024-01-03 RX ORDER — ARIPIPRAZOLE 5 MG/1
5 TABLET ORAL DAILY
Qty: 90 TABLET | Refills: 1 | Status: SHIPPED | OUTPATIENT
Start: 2024-01-03

## 2024-01-04 NOTE — PROGRESS NOTES
Name: Jamal Klein      : 1990      MRN: [id number]  Encounter Provider: Arminda Cohen DO  Encounter Date: 1/3/2024   Encounter department: [facility]    Assessment & Plan     1. Current moderate episode of major depressive disorder, unspecified whether recurrent (HCC)  Comments:  on psych waiting list  increase abilify to 5mg   f/u 3 mo  Orders:  -     ARIPiprazole (ABILIFY) 5 mg tablet; Take 1 tablet (5 mg total) by mouth daily    2. BARBY (obstructive sleep apnea)  Comments:  needs new sleep study  refer for same  Orders:  -     Ambulatory Referral to Sleep Medicine; Future           Subjective      HPI  Pt presents in f/u for depression/adhd.  At last visit, we started abilify.  He notes he has started playing video games again and enjoying them but overall feels not much of a difference.  Still low mood, low energy, low motivation.  On wait list for psych.  Has failed multiple meds.      Pt tolerating adderall.  Feels it lasts and concentration is appropriate.  No chest pain, shortness of breath, palps.      Pt has hx of barby when he was heavier.  Has cpap though cpap feels too powerful to him now.  Has a lot of fatigue in the day.  Not sure if he snores or gasps.      Review of Systems  See hpi    Current Outpatient Medications on File Prior to Visit   Medication Sig    amLODIPine (NORVASC) 5 mg tablet Take 1 tablet (5 mg total) by mouth daily    amphetamine-dextroamphetamine (ADDERALL XR, 20MG,) 20 MG 24 hr capsule Take 1 capsule (20 mg total) by mouth every morning Max Daily Amount: 20 mg    Cholecalciferol (Vitamin D) 50 MCG ( UT) CAPS Take 2,000 Units by mouth daily    EPINEPHrine (EPIPEN) 0.3 mg/0.3 mL SOAJ     indomethacin (INDOCIN) 25 mg capsule Take 25 mg by mouth daily as needed    Lidocaine-Menthol 4-5 % PTCH Apply topically if needed    lisinopril (ZESTRIL) 10 mg tablet Take 1 tablet (10 mg total) by mouth daily    Magnesium 250 MG TABS Take 250 mg by mouth in the  "morning    melatonin 3 mg Take 3 mg by mouth    mirtazapine (REMERON) 7.5 MG tablet Take 1 tablet (7.5 mg total) by mouth daily at bedtime    Multiple Vitamin (MULTIVITAMIN PO) Take 1 tablet by mouth every morning    Omega-3 Fatty Acids (OMEGA 3 PO) Take 650 mg by mouth daily    Probiotic Product (PROBIOTIC PO) Take by mouth daily    rizatriptan (MAXALT) 10 mg tablet Take 1 tablet (10 mg total) by mouth as needed for migraine    traMADol (ULTRAM) 50 mg tablet Take 1 tablet (50 mg total) by mouth every 8 (eight) hours as needed for severe pain    tretinoin (RETIN-A) 0.025 % cream Apply topically daily at bedtime    venlafaxine (EFFEXOR-XR) 37.5 mg 24 hr capsule Take 1 capsule (37.5 mg total) by mouth daily with breakfast    [DISCONTINUED] ARIPiprazole (ABILIFY) 2 mg tablet Take 1 tablet (2 mg total) by mouth daily       Objective     /94   Pulse 94   Temp 97.6 °F (36.4 °C) (Temporal)   Resp 16   Ht 6' 2\" (1.88 m)   Wt 101 kg (222 lb 9.6 oz)   SpO2 99%   BMI 28.58 kg/m²     Physical Exam  Constitutional:       Appearance: Normal appearance.   HENT:      Head: Normocephalic and atraumatic.   Cardiovascular:      Rate and Rhythm: Normal rate and regular rhythm.      Heart sounds: No murmur heard.     No friction rub. No gallop.   Pulmonary:      Effort: Pulmonary effort is normal.      Breath sounds: No wheezing, rhonchi or rales.   Neurological:      General: No focal deficit present.      Mental Status: He is alert and oriented to person, place, and time.   Psychiatric:         Attention and Perception: Attention normal.         Mood and Affect: Mood is depressed. Affect is not inappropriate.         Speech: Speech normal.         Behavior: Behavior is withdrawn.         Thought Content: Thought content normal.         Cognition and Memory: Cognition normal.         Judgment: Judgment normal.       Arminda Cohen, DO    "

## 2024-01-08 DIAGNOSIS — F90.0 ATTENTION DEFICIT HYPERACTIVITY DISORDER (ADHD), PREDOMINANTLY INATTENTIVE TYPE: ICD-10-CM

## 2024-01-09 RX ORDER — DEXTROAMPHETAMINE SACCHARATE, AMPHETAMINE ASPARTATE MONOHYDRATE, DEXTROAMPHETAMINE SULFATE AND AMPHETAMINE SULFATE 5; 5; 5; 5 MG/1; MG/1; MG/1; MG/1
20 CAPSULE, EXTENDED RELEASE ORAL EVERY MORNING
Qty: 30 CAPSULE | Refills: 0 | Status: SHIPPED | OUTPATIENT
Start: 2024-01-09

## 2024-01-09 NOTE — TELEPHONE ENCOUNTER
1 [id number] 11/17/2023 11/17/2023 Mixed Amphetamine Salts (Capsule, Extended Release) 30.0 30 20 MG NA [facility], [employer] Commercial Insurance 0 / 0 [place]    1 [id number] 10/14/2023 10/13/2023 Mixed Amphetamine Salts (Capsule, Extended Release) 30.0 30 20 MG NA [facility], [employer] Commercial Insurance 0 / 0 [place]    1 [id number] 09/12/2023 09/12/2023 Mixed Amphetamine Salts (Capsule, Extended Release) 30.0 30 20 MG NA [facility], [employer]

## 2024-01-15 DIAGNOSIS — G43.009 MIGRAINE WITHOUT AURA AND WITHOUT STATUS MIGRAINOSUS, NOT INTRACTABLE: ICD-10-CM

## 2024-01-16 RX ORDER — RIZATRIPTAN BENZOATE 10 MG/1
10 TABLET ORAL AS NEEDED
Qty: 30 TABLET | Refills: 0 | Status: SHIPPED | OUTPATIENT
Start: 2024-01-16

## 2024-01-30 DIAGNOSIS — F90.0 ATTENTION DEFICIT HYPERACTIVITY DISORDER (ADHD), PREDOMINANTLY INATTENTIVE TYPE: ICD-10-CM

## 2024-01-30 RX ORDER — DEXTROAMPHETAMINE SACCHARATE, AMPHETAMINE ASPARTATE MONOHYDRATE, DEXTROAMPHETAMINE SULFATE AND AMPHETAMINE SULFATE 5; 5; 5; 5 MG/1; MG/1; MG/1; MG/1
20 CAPSULE, EXTENDED RELEASE ORAL EVERY MORNING
Qty: 30 CAPSULE | Refills: 0 | Status: SHIPPED | OUTPATIENT
Start: 2024-01-30

## 2024-02-20 DIAGNOSIS — G47.00 INSOMNIA, UNSPECIFIED TYPE: ICD-10-CM

## 2024-02-21 RX ORDER — MIRTAZAPINE 7.5 MG/1
7.5 TABLET, FILM COATED ORAL
Qty: 90 TABLET | Refills: 1 | Status: SHIPPED | OUTPATIENT
Start: 2024-02-21

## 2024-03-01 DIAGNOSIS — F90.0 ATTENTION DEFICIT HYPERACTIVITY DISORDER (ADHD), PREDOMINANTLY INATTENTIVE TYPE: ICD-10-CM

## 2024-03-01 RX ORDER — DEXTROAMPHETAMINE SACCHARATE, AMPHETAMINE ASPARTATE MONOHYDRATE, DEXTROAMPHETAMINE SULFATE AND AMPHETAMINE SULFATE 5; 5; 5; 5 MG/1; MG/1; MG/1; MG/1
20 CAPSULE, EXTENDED RELEASE ORAL EVERY MORNING
Qty: 30 CAPSULE | Refills: 0 | Status: SHIPPED | OUTPATIENT
Start: 2024-03-01

## 2024-03-04 DIAGNOSIS — F32.1 CURRENT MODERATE EPISODE OF MAJOR DEPRESSIVE DISORDER, UNSPECIFIED WHETHER RECURRENT (HCC): ICD-10-CM

## 2024-03-04 RX ORDER — VENLAFAXINE HYDROCHLORIDE 37.5 MG/1
37.5 CAPSULE, EXTENDED RELEASE ORAL
Qty: 90 CAPSULE | Refills: 1 | Status: SHIPPED | OUTPATIENT
Start: 2024-03-04

## 2024-03-14 DIAGNOSIS — G47.00 INSOMNIA, UNSPECIFIED TYPE: ICD-10-CM

## 2024-03-14 RX ORDER — MIRTAZAPINE 7.5 MG/1
7.5 TABLET, FILM COATED ORAL
Qty: 90 TABLET | Refills: 1 | Status: SHIPPED | OUTPATIENT
Start: 2024-03-14

## 2024-03-26 ENCOUNTER — OFFICE VISIT (OUTPATIENT)
Dept: SLEEP CENTER | Facility: CLINIC | Age: 34
End: 2024-03-26
Payer: COMMERCIAL

## 2024-03-26 VITALS
SYSTOLIC BLOOD PRESSURE: 128 MMHG | WEIGHT: 220 LBS | DIASTOLIC BLOOD PRESSURE: 90 MMHG | HEIGHT: 74 IN | BODY MASS INDEX: 28.23 KG/M2

## 2024-03-26 DIAGNOSIS — G47.33 OSA (OBSTRUCTIVE SLEEP APNEA): ICD-10-CM

## 2024-03-26 PROBLEM — F32.A DEPRESSION: Status: ACTIVE | Noted: 2024-03-26

## 2024-03-26 PROBLEM — Z98.890 S/P ARTHROSCOPY: Status: ACTIVE | Noted: 2019-05-24

## 2024-03-26 PROBLEM — M54.50 CHRONIC BILATERAL LOW BACK PAIN WITHOUT SCIATICA: Status: ACTIVE | Noted: 2020-02-25

## 2024-03-26 PROBLEM — I10 PRIMARY HYPERTENSION: Status: ACTIVE | Noted: 2023-01-06

## 2024-03-26 PROBLEM — G89.29 CHRONIC BILATERAL LOW BACK PAIN WITHOUT SCIATICA: Status: ACTIVE | Noted: 2020-02-25

## 2024-03-26 PROCEDURE — 99204 OFFICE O/P NEW MOD 45 MIN: CPT | Performed by: PSYCHIATRY & NEUROLOGY

## 2024-03-26 NOTE — PROGRESS NOTES
As noted, the patient has a prior history of obstructive sleep apnea but weight much more in the past, has had over a 100 pound weight loss.  It is important to reassess this, a repeat sleep study is needed.  He endorses feelings of fatigue although his [place] Sleepiness Scale score is normal certainly fatigue can be multifactorial, medications could also contribute.  I have ordered a home sleep test, if this confirms obstructive sleep apnea, likely would order him a new auto-CPAP machine and follow-up with him thereafter.  If the home sleep test is negative, he will likely need further testing in the sleep lab.    As noted, he spends a lot of time in bed at night and sleeps a lot, goes to bed early around 8 PM - asleep by 9 pm, awakens around 7 AM, snoozes and is out of bed at 8 am . Weekends, no alarm can sleep until 9-10 am.  Despite this, he still feels sleepy/tired during the day.  He is able to maintain alertness however.    Doesn't think he would fall asleep unintentionally  Feels drained and exhausted    Cc- sleeping 10-11 hours and not feeling rested  Mostly constant past few years worse . Has gained some weight     Lives with his wife and 5 year old daughter  Tea- drinks in the am out of habit    Adderall XR helps energy       M series plus set at 8 cm h20    We discussed the differential at this point is rather broad.  Certainly obstructive sleep apnea could be present and this could be contributing to his feeling of fatigue.  Of note, he had benefit with CPAP in the past and is gained weight from his jose manuel in the past.  To assess for this, I have ordered a home sleep test.    Idiopathic hypersomnolence would also be a diagnostic possibility, evidenced by his long sleep time with nonrefreshing sleep.  I do not suspect narcolepsy as he does not nap or doze during the daytime and his [place] Sleepiness Scale score is normal.  Certainly depression can cause symptoms that can overlap with idiopathic  hypersomnolence, that cannot be ruled out.  He endorses symptoms of depression at present without thoughts of self-harm.  I agree it would be important for him to have assessment by psychiatry.  Likely he will need a polysomnogram followed by multiple sleep latency test, would need to taper off of Adderall prior to that.  That said, given his description of symptoms, there is a chance the MSLT would be normal.  The diagnosis of idiopathic hypersomnolence can also be supported by actigraphy, we can explore that further if needed after these tests as discussed.    It is unlikely he would be classified as a long sleeper as he does not feel restored despite sleeping 10 or 11 hours consistently.

## 2024-03-26 NOTE — PROGRESS NOTES
Sleep Consultation   Jamal Klein 33 y.o. male MRN: [id number]    Reason for consultation: Management of LEATHA and hypersomnolence     Requesting physician: Dr. Arminda Cohen    Assessment/Plan  Jamal Klein is a 33-year-old gentleman with PMH of severe LEATHA not on CPAP, chronic sleep-onset insomnia (managed with Mirtazapine 7.5 mg qhs and melatonin 3 mg qhs), hypersomnia, HTN, migraines without aura (infrequent, well-controlled with Maxalt), depression/anxiety, ADHD, venous insufficiency, vitamin B12 deficiency (compliant with PO supplementation), vitamin D deficiency (compliant with PO supplementation), chronic bilateral wrist pain (s/p multiple surgeries, managed with Indomethacin 25 mg PRN ~1 time per week), and chronic LBP in the setting of degenerative disk disease (tramadol 50 mg PRN ~1 time per week and medicinal marijuana ~2 times per week), who presents to the sleep medicine clinic for the management of LEATHA and hypersomnolence. Diagnostic sleep study in ~2009 (at OSH in [place]) ordered due to snoring, insomnia, and hypersomnia) revealed severe LEATHA (weight of ~350 lbs at time of his last sleep study, not available for our review today). Compliant with CPAP until ~2014 with some improvement in his fatigue/hypersomnia (set pressure of 8 cm H2O). Serum CO2 of 30 on 3/24/2023. Vitamin D of 45 on 3/24/2022. Vitamin B12 of 399 and TSH of 1.84 on 2/1/2022. Weight loss of 170 lbs since ~2014 with intermittent fasting to a jose manuel of ~180 lbs. He has gained ~40 lbs over the past few years. Hypersomnia likely multifactorial due to potential untreated LEATHA, pain, medications, and/or mood disorder. From prior history (history of LEATHA, improvement of daytime symptoms while on CPAP) and physical examination (Mallampati - IV and neck circumference of 43 cm) there is clinical concern for LEATHA which could be contributing to his hypersomnia. Ordered a home sleep study, if any degree of LEATHA noted recommend treatment with auto CPAP. Agree  with referral to psychiatry by PCP. Recommend weight loss. If sleep study does not demonstrate LEATHA or hypersomnia persists despite appropriate treatment of LEATHA, will consider a MSLT study to evaluate for narcolepsy and idiopathic hypersomnolence. Clinical phenotype of prolonged non-refreshing sleep is more consistent with idiopathic hypersomnolence than narcolepsy. Staffed and seen with Dr. Adams on 3/26/2024. Follow-up in 1-3 months after sleep study and initiation of PAP therapy (compliance check).      Hypersomnolence  -[place] of 1  -Compliant with CPAP until ~2014 with some improvement in his fatigue/hypersomnia (set pressure of 8 cm H2O)  -Vitamin D of 45 on 3/24/2022. Vitamin B12 of 399 and TSH of 1.84 on 2/1/2022  -Currently taking PO vitamin B12 and vitamin D supplementation   -Likely multifactorial due to potential untreated LEATHA, pain, medications, and/or mood disorder  -Evaluation and management of potential LEATHA as below   -Agree with referral to psychiatry by PCP  -If sleep study does not demonstrate LEATHA or hypersomnia persists despite appropriate treatment of LEATHA, will consider a MSLT study to evaluate for narcolepsy and idiopathic hypersomnolence. Clinical phenotype of prolonged non-refreshing sleep is more consistent with idiopathic hypersomnolence than narcolepsy    Suspected LEATHA  -Diagnostic sleep study in ~2009 (at OSH in [place]) ordered due to snoring, insomnia, and hypersomnia) revealed severe LEATHA (weight of ~350 lbs at time of his last sleep study, not available for our review today)  -Serum CO2 of 30 on 3/24/2023  -Weight loss of 170 lbs since ~2014 with intermittent fasting to a jose manuel of ~180 lbs. He has gained ~40 lbs over the past few years.   -From prior history (history of LEATHA, improvement of daytime symptoms while on CPAP) and physical examination (Mallampati - IV and neck circumference of 43 cm) there is clinical concern for LEATHA  -Ordered a home sleep study, if any degree of LEATHA noted  recommend treatment with auto CPAP  -Recommend weight loss     Chronic sleep-onset insomnia  -Unclear etiology - likely primary psychophysiological insomnia  -Managed with Mirtazapine 7.5 mg qhs and melatonin 3 mg qhs     Staffed and seen with Dr. Adams on 3/26/2024    Follow-up in 1-3 months after sleep study and initiation of PAP therapy (compliance check)    History of Present Illness   Jamal Klein is a 33-year-old gentleman with PMH of severe LEATHA not on CPAP, chronic sleep-onset insomnia (managed with Mirtazapine 7.5 mg qhs and melatonin 3 mg qhs), hypersomnia, HTN, migraines without aura (infrequent, well-controlled with Maxalt), depression/anxiety, ADHD, venous insufficiency, vitamin B12 deficiency (compliant with PO supplementation), vitamin D deficiency (compliant with PO supplementation), chronic bilateral wrist pain (s/p multiple surgeries, managed with Indomethacin 25 mg PRN ~1 time per week), and chronic LBP in the setting of degenerative disk disease (tramadol 50 mg PRN ~1 time per week and medicinal marijuana ~2 times per week), who presents to the sleep medicine clinic for the management of LEATHA and hypersomnolence. Unaccompanied in the clinic today. Diagnostic sleep study in ~2009 (at OSH in [place]) ordered due to snoring, insomnia, and hypersomnia) revealed severe LEATHA (weight of ~350 lbs at time of his last sleep study, not available for our review today). Compliant with CPAP until ~2014 with some improvement in his fatigue/hypersomnia (set pressure of 8 cm H2O). Serum CO2 of 30 on 3/24/2023. Vitamin D of 45 on 3/24/2022. Vitamin B12 of 399 and TSH of 1.84 on 2/1/2022. ADHD diagnosed during childhood - managed with Adderall XR 20 mg qAM from ~1546-0184 and then resumed in ~2023. History of seasonal allergies which improved with allergy shots. Denies history of prior head trauma, stroke/TIA, seizure, heart disease, arrhythmia, asthma, sinus issues, or TMJ. Weight loss of 170 lbs since ~2014 with  "intermittent fasting to a jose manuel of ~180 lbs. He has gained ~40 lbs over the past few years. Reviewed with patient his PMH, PSH, medications, allergies, social history, and family history which are as documented.    On evaluation, patient is sitting in chair and in no acute distress. Sleep schedule and symptoms as below. History of snoring since childhood which improved with his weigh loss. Chronic sleep-onset insomnia, chronic sleep-maintenance insomnia, and hypersomnia has also been since childhood and preceded him starting antidepressant medications. Symptoms were improved with treatment of LEATHA with CPAP and his weight loss; however, they have gradually returned with weight gain and after he stopped using CPAP. Sleep-onset insomnia managed with mirtazapine 7.5 mg qhs and melatonin 3 mg qhs. Notes he is always \"exhausted and sluggish.\" He is \"tired\" but cannot sleep during the day unless he is in bed. These daytime symptoms have improved since he resumed Adderall XR 20 mg qAM in 2023. Notes symptoms are markedly worse without Adderall. Denies history of confusion upon awakening. Denies post-exertional malaise. His chronic LBP and bilateral wrist pain can sometimes disrupt his sleep. Referred to psychiatry by PCP and he is on the waitlist to be seen. Denies SOB, RUSS, cough, wheezing, chest pain, palpitations, SI, or HI.     Sleep Schedule and Sleep Hygiene:  Patient reports problems with sleep: Problems falling asleep, frequent nocturnal awakenings, non-refreshing sleep, and hypersomnia   Work history:  (works from home)  Work hours: Dayshift   Shift work: denies   Living situation: Lives with wife and daughter (do not disrupt his sleep)    Bed Time: 8:00 PM, he is \"exhausted at this time, like he is all day\"  Lights Out: 8:00 PM  Sleep Onset Latency: ~60 minutes with mirtazapine 7.5 mg qhs and melatonin 3 mg qhs (without these medications, he would just \"lay in bed\")  Frequency of Night-time " "Awakenings: yes, ~2-3 times per week he will awaken for unknown reasons (denies association with pain, discomfort, anxiety, tachycardia, tachypnea, diaphoresis, or nocturia). Quickly falls back asleep   Wake Time: 7:00 AM with multiple alarms  Rise Time: 7:00 AM  Days off schedule: Bed time: 8:00 PM and wake/rise time 8:00 AM  Naps: denies, very infrequently takes naps but recalls \"feeling worse afterwards\"  Total Sleep Time: ~10-11 hours, if <10 hours of sleep feels \"sleep deprived\" and eyelids twitch     Sleep medications: yes, mirtazapine 7.5 mg qhs (started in ~2021) and melatonin 3 mg qhs (started many years ago). Denies prior being on Ambien   Stimulant medications: yes, Adderall XR 20 mg qAM (resumed in 2023, prior was on during childhood) - helps his daytime energy level. Denies history of being on any other stimulant medications   Prior psychiatric medications include Prozac, Lexapro, Bupropion, Abilify, Risperdal, Seroquel, and Ativan  Caffeine use: yes, hot tea (~16-24 oz) per day (habitual)  Alcohol use: denies  Tobacco use: denies  Illicit drug use: yes, medicinal marijuana ~2 times per week for chronic pain      Sleep Disordered Breathing: Limited history as bed partner is a \"deep sleeper\"   Snoring: denies  Witnessed apneas: denies  Shortness of breath or choking/gasping for air: denies  Morning dry mouth: denies  Morning headaches: denies  Non-refreshing sleep: yes, daily  Nasal congestion: denies  Rhinorrhea: denies  Nocturia: denies     Other sleep related behaviors and symptoms:   Restless leg symptoms: denies, prior symptoms at Mirtazapine 15 mg qhs   History of toe curling at night: yes, most nights  History of nocturnal leg cramps: yes, most nights (bilateral calves - managed with movement)  Kicking legs during sleep: denies  Parasomnias: denies  Nightmares: denies  Acid reflux during sleep: denies  Teeth grinding: denies  Sleep paralysis, cataplexy, sleep attacks or hypnagogic or hypnopompic " hallucinations: denies  REM Behavioral Sleep Disorder: denies    Daytime Symptoms:   Excessive daytime sleepiness: denies   Driving while drowsy: denies  History of motor vehicle accidents or near misses: denies  Cognitive problems: yes, issues with concentration. Denies issues with memory   Mood problems: yes, anxiety and depression (stable, follows with PCP - referral placed to psychiatry). Denies SI or HI  Problems at work, school or at home: denies    [place]:  Sitting and reading: Would never doze  Watching TV: Would never doze  Sitting, inactive in a public place (e.g. a theatre or a meeting): Would never doze  As a passenger in a car for an hour without a break: Would never doze  Lying down to rest in the afternoon when circumstances permit: Slight chance of dozing  Sitting and talking to someone: Would never doze  Sitting quietly after a lunch without alcohol: Would never doze  In a car, while stopped for a few minutes in traffic: Would never doze  Total score: 1    History of tonsillectomy: yes, as a child for frequent infections     Social History:  Lives with wife and daughter  Caffeine use: yes, hot tea (~16-24 oz) per day (habitual and helps energy level)  Alcohol use: denies  Tobacco use: denies  Illicit drug use: yes, medicinal marijuana ~2 times per week for chronic pain      Family History:  Father - LEATHA  Denies family history of RLS, insomnia, or narcolepsy    Historical Information   Past Medical History:   Diagnosis Date    Allergic 2014    Anxiety     Depression     Headache(784.0)     HTN (hypertension) 2022    IBS (irritable bowel syndrome) 2013    Inflammatory bowel disease     Migraine 2007    Sleep apnea 2009    TFCC (triangular fibrocartilage complex) tear 2018    Venous insufficiency 2011    Vitamin D deficiency 2015     Past Surgical History:   Procedure Laterality Date    WRIST ARTHROSCOPY Right 05/2019    WRIST ARTHROSCOPY Left 12/2022    with denervation     No family history on  file.  Social History     Socioeconomic History    Marital status: /Civil Union     Spouse name: Not on file    Number of children: Not on file    Years of education: Not on file    Highest education level: Not on file   Occupational History    Not on file   Tobacco Use    Smoking status: Never    Smokeless tobacco: Never   Vaping Use    Vaping status: Never Used   Substance and Sexual Activity    Alcohol use: Not Currently    Drug use: Yes     Types: Marijuana     Comment: medical marijuana    Sexual activity: Yes     Partners: Female     Birth control/protection: Condom Male   Other Topics Concern    Not on file   Social History Narrative    Not on file     Social Determinants of Health     Financial Resource Strain: Low Risk  (8/1/2023)    Overall Financial Resource Strain (CARDIA)     Difficulty of Paying Living Expenses: Not hard at all   Food Insecurity: No Food Insecurity (8/1/2023)    Hunger Vital Sign     Worried About Running Out of Food in the Last Year: Never true     Ran Out of Food in the Last Year: Never true   Transportation Needs: No Transportation Needs (8/1/2023)    PRAPARE - Transportation     Lack of Transportation (Medical): No     Lack of Transportation (Non-Medical): No   Physical Activity: Not on file   Stress: Not on file   Social Connections: Not on file   Intimate Partner Violence: Not on file   Housing Stability: Low Risk  (8/1/2023)    Housing Stability Vital Sign     Unable to Pay for Housing in the Last Year: No     Number of Places Lived in the Last Year: 1     Unstable Housing in the Last Year: No     Meds/Allergies   Allergies   Allergen Reactions    Cefaclor Rash     As infant     Home medications:  Prior to Admission medications    Medication Sig Start Date End Date Taking? Authorizing Provider   amphetamine-dextroamphetamine (ADDERALL XR, 20MG,) 20 MG 24 hr capsule Take 1 capsule (20 mg total) by mouth every morning Max Daily Amount: 20 mg 3/1/24   Arminda Cohen DO  "  amLODIPine (NORVASC) 5 mg tablet Take 1 tablet (5 mg total) by mouth daily 10/10/23   Arminda Cohen DO   ARIPiprazole (ABILIFY) 5 mg tablet Take 1 tablet (5 mg total) by mouth daily 1/3/24   Arminda Cohen DO   Cholecalciferol (Vitamin D) 50 MCG (2000 UT) CAPS Take 2,000 Units by mouth daily    Historical Provider, MD   EPINEPHrine (EPIPEN) 0.3 mg/0.3 mL SOAJ  6/1/23   Historical Provider, MD   indomethacin (INDOCIN) 25 mg capsule Take 25 mg by mouth daily as needed 5/23/23 5/22/24  Historical Provider, MD   Lidocaine-Menthol 4-5 % PTCH Apply topically if needed    Historical Provider, MD   lisinopril (ZESTRIL) 10 mg tablet Take 1 tablet (10 mg total) by mouth daily 11/8/23   Arminda Cohen DO   Magnesium 250 MG TABS Take 250 mg by mouth in the morning    Historical Provider, MD   melatonin 3 mg Take 3 mg by mouth    Historical Provider, MD   mirtazapine (REMERON) 7.5 MG tablet Take 1 tablet (7.5 mg total) by mouth daily at bedtime 3/14/24   Arminda Cohen DO   Multiple Vitamin (MULTIVITAMIN PO) Take 1 tablet by mouth every morning    Historical Provider, MD   Omega-3 Fatty Acids (OMEGA 3 PO) Take 650 mg by mouth daily    Historical Provider, MD   Probiotic Product (PROBIOTIC PO) Take by mouth daily    Historical Provider, MD   rizatriptan (MAXALT) 10 mg tablet Take 1 tablet (10 mg total) by mouth as needed for migraine 1/16/24   Arminda Cohen DO   traMADol (ULTRAM) 50 mg tablet Take 1 tablet (50 mg total) by mouth every 8 (eight) hours as needed for severe pain 9/12/23   Arminda Cohen DO   tretinoin (RETIN-A) 0.025 % cream Apply topically daily at bedtime 8/1/23   Arminda Cohen DO   venlafaxine (EFFEXOR-XR) 37.5 mg 24 hr capsule Take 1 capsule (37.5 mg total) by mouth daily with breakfast 3/4/24   Arminda Cohen DO     Visit Vitals  /90 (BP Location: Left arm, Patient Position: Sitting, Cuff Size: Large)   Ht 6' 2\" (1.88 m)   Wt 99.8 kg (220 lb)   BMI 28.25 kg/m²   Smoking Status Never   BSA 2.26 m²     Physical " "Exam:  General: Sitting in chair, awake, and alert. No acute distress  HEENT: Nares patent, no craniofacial abnormalities. Mucous membranes, moist, no oral lesions, and normal dentition. Mallampati class - IV  NECK: Neck circumference - 43 cm. Trachea midline, no accessory muscle use, and no stridor   CARDIAC: Regular rate and rhythm  PULM: CTA bilaterally no wheezing, rhonchi or rales. No conversational dyspnea  EXT: No peripheral edema    NEURO: No focal neurologic deficits, moving all extremities appropriately    Labs: I have personally reviewed pertinent lab results.  No results found for: \"WBC\", \"HGB\", \"HCT\", \"MCV\", \"PLT\"   Lab Results   Component Value Date    CALCIUM 10.0 03/24/2022    K 4.8 03/24/2022    CO2 30 03/24/2022     03/24/2022    BUN 18 03/24/2022    CREATININE 0.75 03/24/2022     No results found for: \"IRON\", \"TIBC\", \"FERRITIN\"  No results found for: \"OSOCXOWZ00\"  No results found for: \"FOLATE\"    Sleep studies:  -Diagnostic sleep study in ~2009 (at OSH in [place]) ordered due to snoring, insomnia, and hypersomnia) revealed severe LEATHA (weight of ~350 lbs at time of his last sleep study, not available for our review today)                           Eed Guy DO  [facility] Sleep Fellow   "

## 2024-03-29 DIAGNOSIS — G43.009 MIGRAINE WITHOUT AURA AND WITHOUT STATUS MIGRAINOSUS, NOT INTRACTABLE: ICD-10-CM

## 2024-03-29 RX ORDER — RIZATRIPTAN BENZOATE 10 MG/1
10 TABLET ORAL AS NEEDED
Qty: 90 TABLET | Refills: 1 | Status: SHIPPED | OUTPATIENT
Start: 2024-03-29

## 2024-03-31 DIAGNOSIS — F90.0 ATTENTION DEFICIT HYPERACTIVITY DISORDER (ADHD), PREDOMINANTLY INATTENTIVE TYPE: ICD-10-CM

## 2024-04-01 RX ORDER — DEXTROAMPHETAMINE SACCHARATE, AMPHETAMINE ASPARTATE MONOHYDRATE, DEXTROAMPHETAMINE SULFATE AND AMPHETAMINE SULFATE 5; 5; 5; 5 MG/1; MG/1; MG/1; MG/1
20 CAPSULE, EXTENDED RELEASE ORAL EVERY MORNING
Qty: 30 CAPSULE | Refills: 0 | Status: SHIPPED | OUTPATIENT
Start: 2024-04-01

## 2024-04-09 ENCOUNTER — OFFICE VISIT (OUTPATIENT)
Dept: FAMILY MEDICINE CLINIC | Facility: CLINIC | Age: 34
End: 2024-04-09
Payer: COMMERCIAL

## 2024-04-09 VITALS
SYSTOLIC BLOOD PRESSURE: 112 MMHG | DIASTOLIC BLOOD PRESSURE: 80 MMHG | OXYGEN SATURATION: 98 % | RESPIRATION RATE: 16 BRPM | HEIGHT: 74 IN | BODY MASS INDEX: 27.9 KG/M2 | HEART RATE: 80 BPM | WEIGHT: 217.4 LBS

## 2024-04-09 DIAGNOSIS — F32.1 CURRENT MODERATE EPISODE OF MAJOR DEPRESSIVE DISORDER, UNSPECIFIED WHETHER RECURRENT (HCC): Primary | ICD-10-CM

## 2024-04-09 DIAGNOSIS — I10 ESSENTIAL HYPERTENSION: ICD-10-CM

## 2024-04-09 PROCEDURE — 99213 OFFICE O/P EST LOW 20 MIN: CPT | Performed by: FAMILY MEDICINE

## 2024-04-09 RX ORDER — AMLODIPINE BESYLATE 5 MG/1
5 TABLET ORAL DAILY
Qty: 90 TABLET | Refills: 1 | Status: SHIPPED | OUTPATIENT
Start: 2024-04-09

## 2024-04-09 RX ORDER — BUPROPION HYDROCHLORIDE 150 MG/1
150 TABLET ORAL EVERY MORNING
Qty: 30 TABLET | Refills: 2 | Status: SHIPPED | OUTPATIENT
Start: 2024-04-09 | End: 2025-04-04

## 2024-04-09 NOTE — PROGRESS NOTES
"Name: Jamal Klein      : 1990      MRN: [id number]  Encounter Provider: Arminda Cohen DO  Encounter Date: 2024   Encounter department: [facility]    Assessment & Plan     1. Current moderate episode of major depressive disorder, unspecified whether recurrent ([facility])  Comments:  has failed multiple meds over time  self-d/c'd abilify  no inroads with psych access  will trial addition of wellbutrin to low-dose effexor and mirtazepine  Declines partial  F/u 2 mo    Orders:  -     buPROPion (WELLBUTRIN XL) 150 mg 24 hr tablet; Take 1 tablet (150 mg total) by mouth every morning           Subjective      HPI  Pt presents in f/u for mood.  Has used many medications over his lifetime.  At last visit, we increased abilify to 5mg as he had some improvement with 2.5.  he then felt jittery and uncomfortable.  Psychologist told him to stop it, so he did.  Symptoms improved.  States \"every day is the worst.\"  Low mood, low energy, sadness, hopelessness.  Denies si/hi.  Anxiety mostly controlled.  Attention better than before adhd treatment, but not as good as it was when he started.  He has been referred to psych but hasn't gotten an appt anywhere.    Review of Systems  See hpi    Current Outpatient Medications on File Prior to Visit   Medication Sig    amphetamine-dextroamphetamine (ADDERALL XR, 20MG,) 20 MG 24 hr capsule Take 1 capsule (20 mg total) by mouth every morning Max Daily Amount: 20 mg    Cholecalciferol (Vitamin D) 50 MCG ( UT) CAPS Take 2,000 Units by mouth daily    EPINEPHrine (EPIPEN) 0.3 mg/0.3 mL SOAJ     indomethacin (INDOCIN) 25 mg capsule Take 25 mg by mouth daily as needed    Lidocaine-Menthol 4-5 % PTCH Apply topically if needed    lisinopril (ZESTRIL) 10 mg tablet Take 1 tablet (10 mg total) by mouth daily    Magnesium 250 MG TABS Take 250 mg by mouth in the morning    melatonin 3 mg Take 3 mg by mouth    mirtazapine (REMERON) 7.5 MG tablet Take 1 tablet (7.5 mg " "total) by mouth daily at bedtime    Multiple Vitamin (MULTIVITAMIN PO) Take 1 tablet by mouth every morning    Omega-3 Fatty Acids (OMEGA 3 PO) Take 650 mg by mouth daily    Probiotic Product (PROBIOTIC PO) Take by mouth daily    rizatriptan (MAXALT) 10 mg tablet Take 1 tablet (10 mg total) by mouth as needed for migraine    traMADol (ULTRAM) 50 mg tablet Take 1 tablet (50 mg total) by mouth every 8 (eight) hours as needed for severe pain    tretinoin (RETIN-A) 0.025 % cream Apply topically daily at bedtime    venlafaxine (EFFEXOR-XR) 37.5 mg 24 hr capsule Take 1 capsule (37.5 mg total) by mouth daily with breakfast    [DISCONTINUED] amLODIPine (NORVASC) 5 mg tablet Take 1 tablet (5 mg total) by mouth daily    [DISCONTINUED] ARIPiprazole (ABILIFY) 5 mg tablet Take 1 tablet (5 mg total) by mouth daily       Objective     /80   Pulse 80   Resp 16   Ht 6' 2\" (1.88 m)   Wt 98.6 kg (217 lb 6.4 oz)   SpO2 98%   BMI 27.91 kg/m²     Physical Exam  Constitutional:       Appearance: Normal appearance.   HENT:      Head: Normocephalic and atraumatic.   Cardiovascular:      Rate and Rhythm: Normal rate and regular rhythm.      Heart sounds: No murmur heard.     No friction rub. No gallop.   Pulmonary:      Effort: Pulmonary effort is normal.      Breath sounds: No wheezing, rhonchi or rales.   Neurological:      General: No focal deficit present.      Mental Status: He is alert and oriented to person, place, and time.   Psychiatric:         Attention and Perception: Attention normal.         Mood and Affect: Mood is depressed. Affect is flat.         Speech: Speech normal.         Behavior: Behavior is slowed.         Thought Content: Thought content normal.         Cognition and Memory: Cognition normal. Memory is not impaired.         Judgment: Judgment normal. Judgment is not impulsive or inappropriate.       Arminda Cohen,     "

## 2024-04-18 DIAGNOSIS — I10 ESSENTIAL HYPERTENSION: ICD-10-CM

## 2024-04-18 RX ORDER — LISINOPRIL 10 MG/1
10 TABLET ORAL DAILY
Qty: 90 TABLET | Refills: 1 | Status: SHIPPED | OUTPATIENT
Start: 2024-04-18

## 2024-04-19 ENCOUNTER — HOSPITAL ENCOUNTER (OUTPATIENT)
Dept: SLEEP CENTER | Facility: CLINIC | Age: 34
Discharge: HOME | End: 2024-04-19
Payer: COMMERCIAL

## 2024-04-19 DIAGNOSIS — G47.33 OSA (OBSTRUCTIVE SLEEP APNEA): ICD-10-CM

## 2024-04-19 PROCEDURE — G0399 HOME SLEEP TEST/TYPE 3 PORTA: HCPCS

## 2024-04-19 NOTE — PROGRESS NOTES
Home Sleep Study Documentation    HOME STUDY DEVICE: Noxturnal no                                           Rach G3 yes device #30      Pre-Sleep Home Study:    Set-up and instructions performed by: Lola    Technician performed demonstration for Patient: yes    Return demonstration performed by Patient: yes    Written instructions provided to Patient: yes    Patient signed consent form: yes        Post-Sleep Home Study:    Additional comments by Patient:       Home Sleep Study Failed:no:    Failure reason: N/A    Reported or Detected: N/A    Scored by: SONAL Izquierdo

## 2024-04-24 PROBLEM — G47.33 OSA (OBSTRUCTIVE SLEEP APNEA): Status: ACTIVE | Noted: 2024-04-24

## 2024-04-29 DIAGNOSIS — F90.0 ATTENTION DEFICIT HYPERACTIVITY DISORDER (ADHD), PREDOMINANTLY INATTENTIVE TYPE: ICD-10-CM

## 2024-04-30 PROCEDURE — 95806 SLEEP STUDY UNATT&RESP EFFT: CPT | Performed by: PSYCHIATRY & NEUROLOGY

## 2024-04-30 RX ORDER — DEXTROAMPHETAMINE SACCHARATE, AMPHETAMINE ASPARTATE MONOHYDRATE, DEXTROAMPHETAMINE SULFATE AND AMPHETAMINE SULFATE 5; 5; 5; 5 MG/1; MG/1; MG/1; MG/1
20 CAPSULE, EXTENDED RELEASE ORAL EVERY MORNING
Qty: 30 CAPSULE | Refills: 0 | Status: SHIPPED | OUTPATIENT
Start: 2024-04-30

## 2024-05-02 ENCOUNTER — OFFICE VISIT (OUTPATIENT)
Dept: SLEEP CENTER | Facility: CLINIC | Age: 34
End: 2024-05-02
Payer: COMMERCIAL

## 2024-05-02 VITALS
SYSTOLIC BLOOD PRESSURE: 110 MMHG | BODY MASS INDEX: 27.59 KG/M2 | WEIGHT: 215 LBS | DIASTOLIC BLOOD PRESSURE: 79 MMHG | HEIGHT: 74 IN

## 2024-05-02 DIAGNOSIS — G47.33 OSA (OBSTRUCTIVE SLEEP APNEA): Primary | ICD-10-CM

## 2024-05-02 DIAGNOSIS — G47.09 OTHER INSOMNIA: ICD-10-CM

## 2024-05-02 PROCEDURE — 99213 OFFICE O/P EST LOW 20 MIN: CPT | Performed by: PSYCHIATRY & NEUROLOGY

## 2024-05-02 PROCEDURE — 3078F DIAST BP <80 MM HG: CPT | Performed by: PSYCHIATRY & NEUROLOGY

## 2024-05-02 PROCEDURE — 3074F SYST BP LT 130 MM HG: CPT | Performed by: PSYCHIATRY & NEUROLOGY

## 2024-05-02 RX ORDER — ZOLPIDEM TARTRATE 5 MG/1
5 TABLET ORAL
Qty: 1 TABLET | Refills: 0 | Status: SHIPPED | OUTPATIENT
Start: 2024-05-02

## 2024-05-02 NOTE — PROGRESS NOTES
Assessment/Plan:    1. LEATHA (obstructive sleep apnea)  -     Diagnostic Sleep Study; Future; Expected date: 05/03/2024    2. Other insomnia  -     zolpidem (AMBIEN) 5 mg tablet; Take 1 tablet (5 mg total) by mouth daily at bedtime as needed for sleep      We discussed that as I look at the home sleep test again, I am not certain if this result is accurate.  The respiratory event index was elevated at 6 but for some of the events, they may represent central apneas, there is a limitation to home sleep test and that the effort belt are not always very accurate and some of these events had questionable signal.  As Santnaa felt he did not sleep well this night of testing, I do not think this result is diagnostic enough to order CPAP at home.  I would like him to have a diagnostic polysomnogram for a definitive diagnosis.    We discussed the possibility of also adding a multiple sleep latency test.  This would be more complicated as it would require tapering a medication such as Adderall.  Therefore we decided to first focus on the overnight sleep test.    Certainly, his history seems consistent with idiopathic hypersomnolence but the differential is broad and other causes would need to be considered.    Subjective:      Patient ID: Jamal Klein is a 34 y.o. male.    HPI  This is a 34 old male who returns in a follow-up visit, after a recent home sleep test.  That test showed mild obstructive sleep apnea with an respiratory event index of 6.  Minimum saturation was 91%.  He presented to me with a chief complaint of sleeping 10 to 11 hours a night and not feeling rested, he had been treated with an old M series CPAP machine in the past but has not used CPAP in many years.  He notes this problem requiring a lot of sleep has been longstanding, present for over 10 years.  He was recently prescribed bupropion which for the first day caused more alertness but that effect subsided.  He has been prescribed Adderall since last  "summer which has caused some slight benefit but still the symptom of tiredness has persisted.    He describes that the home sleep test recently completed was not a typical night of sleep for him.  He felt that he was up for much of the night    He goes to bed at 8 PM, is asleep in  minutes.  Awake at 7 AM, sleeping 11 hours a night.  [place] Sleepiness Scale score 0.      Always feels tired, not sleepy.  Does not nap and cannot nap\  If he sleeps under 10 hours is very tired, under 8 hours of sleep is even worse but even if he sleeps 11 hours he is not refreshed and still feels tired.    Had been on Adderall for 1 year for attention deficit hyperactivity diosrder.  Would feel more tired without it but not sleepy    No sleep paralysis, no cataplexy, no sleep halluciations     [place] Sleepiness Scale  Sitting and reading: Would never doze  Watching TV: Would never doze  Sitting, inactive in a public place (e.g. a theatre or a meeting): Would never doze  As a passenger in a car for an hour without a break: Would never doze  Lying down to rest in the afternoon when circumstances permit: Would never doze  Sitting and talking to someone: Would never doze  Sitting quietly after a lunch without alcohol: Would never doze  In a car, while stopped for a few minutes in traffic: Would never doze  Total score: 0      Review of Systems  (As above unless noted)    Objective:      /79 (BP Location: Left arm, Patient Position: Sitting, Cuff Size: Large)   Ht 6' 2\" (1.88 m)   Wt 97.5 kg (215 lb)   BMI 27.60 kg/m²          Physical Exam    "

## 2024-05-29 DIAGNOSIS — F90.0 ATTENTION DEFICIT HYPERACTIVITY DISORDER (ADHD), PREDOMINANTLY INATTENTIVE TYPE: ICD-10-CM

## 2024-05-30 RX ORDER — DEXTROAMPHETAMINE SACCHARATE, AMPHETAMINE ASPARTATE MONOHYDRATE, DEXTROAMPHETAMINE SULFATE AND AMPHETAMINE SULFATE 5; 5; 5; 5 MG/1; MG/1; MG/1; MG/1
20 CAPSULE, EXTENDED RELEASE ORAL EVERY MORNING
Qty: 30 CAPSULE | Refills: 0 | Status: SHIPPED | OUTPATIENT
Start: 2024-05-30

## 2024-06-03 ENCOUNTER — HOSPITAL ENCOUNTER (OUTPATIENT)
Dept: SLEEP CENTER | Facility: CLINIC | Age: 34
Discharge: HOME | End: 2024-06-03
Payer: COMMERCIAL

## 2024-06-03 DIAGNOSIS — G47.33 OSA (OBSTRUCTIVE SLEEP APNEA): ICD-10-CM

## 2024-06-03 PROCEDURE — 95810 POLYSOM 6/> YRS 4/> PARAM: CPT

## 2024-06-03 PROCEDURE — 95810 POLYSOM 6/> YRS 4/> PARAM: CPT | Performed by: PSYCHIATRY & NEUROLOGY

## 2024-06-04 ENCOUNTER — OFFICE VISIT (OUTPATIENT)
Dept: FAMILY MEDICINE CLINIC | Facility: CLINIC | Age: 34
End: 2024-06-04
Payer: COMMERCIAL

## 2024-06-04 VITALS
OXYGEN SATURATION: 99 % | RESPIRATION RATE: 18 BRPM | SYSTOLIC BLOOD PRESSURE: 128 MMHG | BODY MASS INDEX: 28.11 KG/M2 | HEART RATE: 80 BPM | DIASTOLIC BLOOD PRESSURE: 80 MMHG | HEIGHT: 74 IN | WEIGHT: 219 LBS

## 2024-06-04 DIAGNOSIS — F33.2 SEVERE EPISODE OF RECURRENT MAJOR DEPRESSIVE DISORDER, WITHOUT PSYCHOTIC FEATURES (HCC): Primary | ICD-10-CM

## 2024-06-04 PROCEDURE — 99213 OFFICE O/P EST LOW 20 MIN: CPT | Performed by: FAMILY MEDICINE

## 2024-06-04 NOTE — PROGRESS NOTES
Sleep Study Documentation    Pre-Sleep Study       Sleep testing procedure explained to patient:YES    Patient napped prior to study:NO    Caffeine:Dayshift worker after 12PM.  Caffeine use:NO    Alcohol:Dayshift workers after 5PM: Alcohol use:NO    Typical day for patient:YES       Study Documentation    Sleep Study Indications: snoring witnessed apneas daytime sleepines     Sleep Study: Diagnostic   Snore:Moderate  Supplemental O2: no        Minimum SaO2 93  Baseline SaO2 95        EKG abnormalities: no     EEG abnormalities: no    Were abnormal behaviors in sleep observed:NO    Is Total Sleep Study Recording Time < 2 hours: N/A    Is Total Sleep Study Recording Time > 2 hours but study is incomplete: N/A    Is Total Sleep Study Recording Time 6 hours or more but sleep was not obtained: NO        Post-Sleep Study    Medication used at bedtime or during sleep study:YES over the counter sleep aid  and sleep aid    Patient reports time it took to fall asleep:20 to 30 minutes    Patient reports waking up during study:Denied    Patient reports sleeping 6 to 8 hours without dreaming.    Does the Patient feel this is a typical night of sleep:better than usual    Patient rated sleepiness: Somewhat sleepy or tired    PAP treatment:no.

## 2024-06-05 NOTE — PROGRESS NOTES
"Ambulatory Visit  Name: Jamal Klein      : 1990      MRN: [id number]  Encounter Provider: Arminda Cohen DO  Encounter Date: 2024   Encounter department: [facility]    Assessment & Plan     1. Severe episode of recurrent major depression disorder, without psychotic features    has tried and failed mulitple regimens  I don't feel I can add anything at this point  discussed that he needs med expert (psych)  hasn't had access  pt to call [facility] to see if they can see him  I feel strongly that pt would benefit from partial, but he refuses  If no access with eth, will try and come up with another plan    History of Present Illness     HPI  Pt presents in f/u for depression, anxiety, adhd.  He has failed multiple meds over years.  At last visit, we started wellbutrin low-dose to see if it would help with ongoing depression.  He states that he doesn't think anything is better in terms of mood.  Isn't worse except that he isn't sleeping quite as well with the wellbutrin.  Anxiety is same.  No si/hi, but ongoing severely low mood, motivation, sadness, hopelessness. Isn't a candidate for hospitalization.  Again declines partial     Review of Systems  See hpi      Objective     /80   Pulse 80   Resp 18   Ht 6' 2\" (1.88 m)   Wt 99.3 kg (219 lb)   SpO2 99%   BMI 28.12 kg/m²     Physical Exam  Constitutional:       Appearance: Normal appearance.   HENT:      Head: Normocephalic and atraumatic.   Cardiovascular:      Rate and Rhythm: Normal rate and regular rhythm.      Heart sounds: No murmur heard.     No friction rub. No gallop.   Pulmonary:      Effort: Pulmonary effort is normal.      Breath sounds: No wheezing, rhonchi or rales.   Neurological:      General: No focal deficit present.      Mental Status: He is alert and oriented to person, place, and time.   Psychiatric:         Attention and Perception: Attention normal.         Mood and Affect: Mood is depressed.    "      Speech: Speech is delayed.         Behavior: Behavior is withdrawn. Behavior is cooperative.         Thought Content: Thought content normal.         Cognition and Memory: Cognition normal.         Judgment: Judgment normal.       Administrative Statements

## 2024-06-09 DIAGNOSIS — F32.1 CURRENT MODERATE EPISODE OF MAJOR DEPRESSIVE DISORDER, UNSPECIFIED WHETHER RECURRENT (HCC): ICD-10-CM

## 2024-06-10 RX ORDER — BUPROPION HYDROCHLORIDE 150 MG/1
150 TABLET ORAL EVERY MORNING
Qty: 90 TABLET | Refills: 0 | Status: SHIPPED | OUTPATIENT
Start: 2024-06-10 | End: 2025-06-05

## 2024-06-12 PROBLEM — G47.8 OTHER SLEEP DISORDERS: Status: ACTIVE | Noted: 2024-06-12

## 2024-06-18 ENCOUNTER — TELEPHONE (OUTPATIENT)
Dept: SLEEP CENTER | Facility: CLINIC | Age: 34
End: 2024-06-18

## 2024-06-18 DIAGNOSIS — G47.09 OTHER INSOMNIA: ICD-10-CM

## 2024-06-18 DIAGNOSIS — I10 PRIMARY HYPERTENSION: ICD-10-CM

## 2024-06-18 DIAGNOSIS — G47.33 OSA (OBSTRUCTIVE SLEEP APNEA): Primary | ICD-10-CM

## 2024-06-18 NOTE — TELEPHONE ENCOUNTER
Ayush Dillon-I reanalyzed your sleep study and if it feels the definition of obstructive sleep apnea, there are several criteria that can be used to assess the definition of obstructive sleep apnea and I believe [facility] recognizes the American Academy of sleep medicine is recommended definition.  Based upon this, I have ordered a CPAP machine, certainly this would resolve the snoring issue and should be helpful.  As you did well with CPAP in the past, I am hopeful that using this again would provide benefit.  My office will help coordinate with you to get the CPAP machine at home

## 2024-06-27 DIAGNOSIS — F90.0 ATTENTION DEFICIT HYPERACTIVITY DISORDER (ADHD), PREDOMINANTLY INATTENTIVE TYPE: ICD-10-CM

## 2024-06-27 RX ORDER — DEXTROAMPHETAMINE SACCHARATE, AMPHETAMINE ASPARTATE MONOHYDRATE, DEXTROAMPHETAMINE SULFATE AND AMPHETAMINE SULFATE 5; 5; 5; 5 MG/1; MG/1; MG/1; MG/1
20 CAPSULE, EXTENDED RELEASE ORAL EVERY MORNING
Qty: 30 CAPSULE | Refills: 0 | Status: SHIPPED | OUTPATIENT
Start: 2024-06-27

## 2024-06-30 DIAGNOSIS — I10 ESSENTIAL HYPERTENSION: ICD-10-CM

## 2024-06-30 DIAGNOSIS — G43.009 MIGRAINE WITHOUT AURA AND WITHOUT STATUS MIGRAINOSUS, NOT INTRACTABLE: ICD-10-CM

## 2024-06-30 RX ORDER — AMLODIPINE BESYLATE 5 MG/1
5 TABLET ORAL DAILY
Qty: 90 TABLET | Refills: 1 | Status: SHIPPED | OUTPATIENT
Start: 2024-06-30

## 2024-06-30 RX ORDER — RIZATRIPTAN BENZOATE 10 MG/1
10 TABLET ORAL AS NEEDED
Qty: 90 TABLET | Refills: 1 | Status: SHIPPED | OUTPATIENT
Start: 2024-06-30

## 2024-08-03 DIAGNOSIS — F90.0 ATTENTION DEFICIT HYPERACTIVITY DISORDER (ADHD), PREDOMINANTLY INATTENTIVE TYPE: ICD-10-CM

## 2024-08-06 RX ORDER — DEXTROAMPHETAMINE SACCHARATE, AMPHETAMINE ASPARTATE MONOHYDRATE, DEXTROAMPHETAMINE SULFATE AND AMPHETAMINE SULFATE 5; 5; 5; 5 MG/1; MG/1; MG/1; MG/1
20 CAPSULE, EXTENDED RELEASE ORAL EVERY MORNING
Qty: 30 CAPSULE | Refills: 0 | Status: SHIPPED | OUTPATIENT
Start: 2024-08-06 | End: 2024-08-08 | Stop reason: SDUPTHER

## 2024-08-06 NOTE — TELEPHONE ENCOUNTER
Please review and advise if refill is appropriate. Per patient's chart, patient has not established care with psychiatry within the network.

## 2024-08-07 RX ORDER — DEXTROAMPHETAMINE SACCHARATE, AMPHETAMINE ASPARTATE MONOHYDRATE, DEXTROAMPHETAMINE SULFATE AND AMPHETAMINE SULFATE 5; 5; 5; 5 MG/1; MG/1; MG/1; MG/1
20 CAPSULE, EXTENDED RELEASE ORAL EVERY MORNING
Qty: 30 CAPSULE | Refills: 0 | Status: CANCELLED | OUTPATIENT
Start: 2024-08-07

## 2024-08-08 ENCOUNTER — PATIENT MESSAGE (OUTPATIENT)
Dept: FAMILY MEDICINE CLINIC | Facility: CLINIC | Age: 34
End: 2024-08-08

## 2024-08-08 DIAGNOSIS — F90.0 ATTENTION DEFICIT HYPERACTIVITY DISORDER (ADHD), PREDOMINANTLY INATTENTIVE TYPE: ICD-10-CM

## 2024-08-08 RX ORDER — DEXTROAMPHETAMINE SACCHARATE, AMPHETAMINE ASPARTATE MONOHYDRATE, DEXTROAMPHETAMINE SULFATE AND AMPHETAMINE SULFATE 5; 5; 5; 5 MG/1; MG/1; MG/1; MG/1
20 CAPSULE, EXTENDED RELEASE ORAL EVERY MORNING
Qty: 30 CAPSULE | Refills: 0 | Status: SHIPPED | OUTPATIENT
Start: 2024-08-08 | End: 2024-08-12 | Stop reason: SDUPTHER

## 2024-08-09 ENCOUNTER — PATIENT MESSAGE (OUTPATIENT)
Dept: FAMILY MEDICINE CLINIC | Facility: CLINIC | Age: 34
End: 2024-08-09

## 2024-08-09 DIAGNOSIS — F90.0 ATTENTION DEFICIT HYPERACTIVITY DISORDER (ADHD), PREDOMINANTLY INATTENTIVE TYPE: ICD-10-CM

## 2024-08-12 RX ORDER — DEXTROAMPHETAMINE SACCHARATE, AMPHETAMINE ASPARTATE MONOHYDRATE, DEXTROAMPHETAMINE SULFATE AND AMPHETAMINE SULFATE 5; 5; 5; 5 MG/1; MG/1; MG/1; MG/1
20 CAPSULE, EXTENDED RELEASE ORAL EVERY MORNING
Qty: 30 CAPSULE | Refills: 0 | Status: SHIPPED | OUTPATIENT
Start: 2024-08-12

## 2024-09-05 DIAGNOSIS — F90.0 ATTENTION DEFICIT HYPERACTIVITY DISORDER (ADHD), PREDOMINANTLY INATTENTIVE TYPE: ICD-10-CM

## 2024-09-11 DIAGNOSIS — F90.0 ATTENTION DEFICIT HYPERACTIVITY DISORDER (ADHD), PREDOMINANTLY INATTENTIVE TYPE: ICD-10-CM

## 2024-09-11 RX ORDER — DEXTROAMPHETAMINE SACCHARATE, AMPHETAMINE ASPARTATE MONOHYDRATE, DEXTROAMPHETAMINE SULFATE AND AMPHETAMINE SULFATE 5; 5; 5; 5 MG/1; MG/1; MG/1; MG/1
20 CAPSULE, EXTENDED RELEASE ORAL EVERY MORNING
Qty: 30 CAPSULE | Refills: 0 | Status: SHIPPED | OUTPATIENT
Start: 2024-09-11 | End: 2024-09-17 | Stop reason: SDUPTHER

## 2024-09-11 NOTE — TELEPHONE ENCOUNTER
Patients wife is calling back to see if Adderall was called in.  Please advise, patient needs refill.

## 2024-09-16 ENCOUNTER — PATIENT MESSAGE (OUTPATIENT)
Dept: FAMILY MEDICINE CLINIC | Facility: CLINIC | Age: 34
End: 2024-09-16

## 2024-09-16 NOTE — TELEPHONE ENCOUNTER
This was just refilled.  Does he need it, and if so, where does he want it sent if his pharmacy doesn't have it.

## 2024-09-17 ENCOUNTER — TELEMEDICINE (OUTPATIENT)
Dept: FAMILY MEDICINE CLINIC | Facility: CLINIC | Age: 34
End: 2024-09-17
Payer: COMMERCIAL

## 2024-09-17 VITALS — BODY MASS INDEX: 27.6 KG/M2 | WEIGHT: 215 LBS

## 2024-09-17 DIAGNOSIS — R05.8 POST-VIRAL COUGH SYNDROME: ICD-10-CM

## 2024-09-17 DIAGNOSIS — Z86.16 HISTORY OF COVID-19: ICD-10-CM

## 2024-09-17 DIAGNOSIS — I10 PRIMARY HYPERTENSION: ICD-10-CM

## 2024-09-17 DIAGNOSIS — J20.8 ACUTE BRONCHITIS DUE TO OTHER SPECIFIED ORGANISMS: Primary | ICD-10-CM

## 2024-09-17 PROCEDURE — 99214 OFFICE O/P EST MOD 30 MIN: CPT | Performed by: FAMILY MEDICINE

## 2024-09-17 RX ORDER — AZELASTINE HYDROCHLORIDE 137 UG/1
2 SPRAY, METERED NASAL 2 TIMES DAILY PRN
COMMUNITY
Start: 2024-08-23

## 2024-09-17 RX ORDER — TRAZODONE HYDROCHLORIDE 100 MG/1
100 TABLET ORAL
COMMUNITY
Start: 2024-08-19

## 2024-09-17 RX ORDER — DEXTROAMPHETAMINE SACCHARATE, AMPHETAMINE ASPARTATE MONOHYDRATE, DEXTROAMPHETAMINE SULFATE AND AMPHETAMINE SULFATE 5; 5; 5; 5 MG/1; MG/1; MG/1; MG/1
20 CAPSULE, EXTENDED RELEASE ORAL EVERY MORNING
Qty: 30 CAPSULE | Refills: 0 | OUTPATIENT
Start: 2024-09-17

## 2024-09-17 RX ORDER — BENZONATATE 100 MG/1
100 CAPSULE ORAL 3 TIMES DAILY PRN
Qty: 30 CAPSULE | Refills: 0 | Status: SHIPPED | OUTPATIENT
Start: 2024-09-17 | End: 2024-09-27

## 2024-09-17 RX ORDER — DEXTROAMPHETAMINE SACCHARATE, AMPHETAMINE ASPARTATE MONOHYDRATE, DEXTROAMPHETAMINE SULFATE AND AMPHETAMINE SULFATE 5; 5; 5; 5 MG/1; MG/1; MG/1; MG/1
20 CAPSULE, EXTENDED RELEASE ORAL EVERY MORNING
Qty: 30 CAPSULE | Refills: 0 | Status: SHIPPED | OUTPATIENT
Start: 2024-09-17

## 2024-09-17 RX ORDER — ALBUTEROL SULFATE 90 UG/1
2 INHALANT RESPIRATORY (INHALATION) EVERY 4 HOURS PRN
Qty: 18 G | Refills: 0 | Status: SHIPPED | OUTPATIENT
Start: 2024-09-17 | End: 2024-09-27

## 2024-09-17 RX ORDER — DOXYCYCLINE 100 MG/1
100 TABLET ORAL 2 TIMES DAILY
Qty: 20 TABLET | Refills: 0 | Status: SHIPPED | OUTPATIENT
Start: 2024-09-17 | End: 2024-09-27

## 2024-09-17 RX ORDER — BUPROPION HYDROCHLORIDE 300 MG/1
300 TABLET ORAL EVERY MORNING
COMMUNITY
Start: 2024-07-16

## 2024-09-17 RX ORDER — AZELASTINE HYDROCHLORIDE 0.5 MG/ML
1 SOLUTION/ DROPS OPHTHALMIC 2 TIMES DAILY PRN
COMMUNITY
Start: 2024-08-09 | End: 2025-08-09

## 2024-09-17 NOTE — TELEPHONE ENCOUNTER
Pt seen today for virtual visit.  He would like his Adderall XR Rx sent to [facility] instead as [facility] does not have Rx in stock.

## 2024-09-17 NOTE — PROGRESS NOTES
Virtual Regular Visit  Name: Jamal Klein      : 1990      MRN: [id number]  Encounter Provider: Vihdi Enrique DO  Encounter Date: 2024   Encounter department: [facility]    Verification of patient location:    Patient is located at Home in the following state in which I hold an active license [place]    Assessment & Plan  Acute bronchitis due to other specified organisms    Orders:    doxycycline (ADOXA) 100 MG tablet; Take 1 tablet (100 mg total) by mouth 2 (two) times a day for 10 days    albuterol (ProAir HFA) 90 mcg/act inhaler; Inhale 2 puffs every 4 (four) hours as needed for wheezing or shortness of breath (Cough) for up to 10 days    benzonatate (TESSALON PERLES) 100 mg capsule; Take 1 capsule (100 mg total) by mouth 3 (three) times a day as needed for cough for up to 10 days      Advise Leandro med sinus rinse kit, Mucinex, Claritin/Zyrtec/Allegra/Xyzal, Flonase / Nasacort nasal spray.  Avoid decongestants if you have high blood pressure.    Post-viral cough syndrome    Orders:    doxycycline (ADOXA) 100 MG tablet; Take 1 tablet (100 mg total) by mouth 2 (two) times a day for 10 days    albuterol (ProAir HFA) 90 mcg/act inhaler; Inhale 2 puffs every 4 (four) hours as needed for wheezing or shortness of breath (Cough) for up to 10 days    benzonatate (TESSALON PERLES) 100 mg capsule; Take 1 capsule (100 mg total) by mouth 3 (three) times a day as needed for cough for up to 10 days    May need tincture of time.    Advise Leandro med sinus rinse kit, Mucinex, Claritin/Zyrtec/Allegra/Xyzal, Flonase / Nasacort nasal spray.  Avoid decongestants if you have high blood pressure.    Primary hypertension  To consider ACE-I cough if symptoms persist s/p treatment?           History of COVID-19      See above.                Encounter provider Vidhi Enrique DO    The patient was identified by name and date of birth. Jamal Klein was informed that this is a telemedicine visit  and that the visit is being conducted through the Epic Embedded platform. He agrees to proceed..  My office door was closed. No one else was in the room.  He acknowledged consent and understanding of privacy and security of the video platform. The patient has agreed to participate and understands they can discontinue the visit at any time.    Patient is aware this is a billable service.     History of Present Illness     HPI    Cough - Symptoms x 1 month since Dx COVID 8/24.  Dry cough, previously wet.  Worsens throughout the day, and worst at night.  Used Delsym s benefit.      On Lisinopril 10mg QD.      History obtained from : patient  Review of Systems   Constitutional:  Negative for appetite change, chills, diaphoresis, fatigue and fever.   Respiratory:  Positive for cough (Dry cough). Negative for shortness of breath and wheezing.    Cardiovascular:  Negative for chest pain.   Gastrointestinal:  Negative for abdominal pain, blood in stool, diarrhea, nausea and vomiting.   Genitourinary:  Negative for dysuria.           Objective     Wt 97.5 kg (215 lb)   BMI 27.60 kg/m²   Physical Exam  Vitals and nursing note reviewed.   Constitutional:       General: He is not in acute distress.     Appearance: Normal appearance. He is well-developed.   HENT:      Head: Normocephalic and atraumatic.      Right Ear: External ear normal.      Left Ear: External ear normal.      Nose: Nose normal.      Mouth/Throat:      Pharynx: Oropharynx is clear. No oropharyngeal exudate or posterior oropharyngeal erythema.   Eyes:      Extraocular Movements: Extraocular movements intact.      Conjunctiva/sclera: Conjunctivae normal.   Pulmonary:      Effort: Pulmonary effort is normal. No respiratory distress.   Musculoskeletal:      Cervical back: Normal range of motion.   Skin:     General: Skin is dry.   Neurological:      General: No focal deficit present.      Mental Status: He is alert and oriented to person, place, and time.    Psychiatric:         Mood and Affect: Mood normal.         Visit Time  Total Visit Duration: 14 minutes

## 2024-09-17 NOTE — PATIENT INSTRUCTIONS
Advise Leandro med sinus rinse kit, Mucinex, Claritin/Zyrtec/Allegra/Xyzal, Flonase / Nasacort nasal spray.  Avoid decongestants if you have high blood pressure.

## 2024-10-08 ENCOUNTER — TELEPHONE (OUTPATIENT)
Age: 34
End: 2024-10-08

## 2024-10-08 NOTE — TELEPHONE ENCOUNTER
Contacted patient off of  Medication Management  to verify needs of services in attempts to offer patient an appointment. spoke with patient whom stated has set up services with outside provider. Writer shared pt is removed from wait list.

## 2024-10-15 DIAGNOSIS — I10 ESSENTIAL HYPERTENSION: ICD-10-CM

## 2024-10-16 DIAGNOSIS — I10 ESSENTIAL HYPERTENSION: Primary | ICD-10-CM

## 2024-10-16 RX ORDER — LISINOPRIL 10 MG/1
10 TABLET ORAL DAILY
Qty: 90 TABLET | Refills: 0 | Status: SHIPPED | OUTPATIENT
Start: 2024-10-16 | End: 2024-10-17 | Stop reason: SDUPTHER

## 2024-10-17 DIAGNOSIS — F90.0 ATTENTION DEFICIT HYPERACTIVITY DISORDER (ADHD), PREDOMINANTLY INATTENTIVE TYPE: ICD-10-CM

## 2024-10-17 DIAGNOSIS — I10 ESSENTIAL HYPERTENSION: ICD-10-CM

## 2024-10-18 RX ORDER — LISINOPRIL 10 MG/1
10 TABLET ORAL DAILY
Qty: 90 TABLET | Refills: 0 | Status: SHIPPED | OUTPATIENT
Start: 2024-10-18

## 2024-10-18 RX ORDER — DEXTROAMPHETAMINE SACCHARATE, AMPHETAMINE ASPARTATE MONOHYDRATE, DEXTROAMPHETAMINE SULFATE AND AMPHETAMINE SULFATE 5; 5; 5; 5 MG/1; MG/1; MG/1; MG/1
20 CAPSULE, EXTENDED RELEASE ORAL EVERY MORNING
Qty: 30 CAPSULE | Refills: 0 | Status: SHIPPED | OUTPATIENT
Start: 2024-10-18

## 2024-12-16 DIAGNOSIS — I10 ESSENTIAL HYPERTENSION: ICD-10-CM

## 2024-12-17 RX ORDER — AMLODIPINE BESYLATE 5 MG/1
5 TABLET ORAL DAILY
Qty: 90 TABLET | Refills: 1 | Status: SHIPPED | OUTPATIENT
Start: 2024-12-17

## 2025-04-30 DIAGNOSIS — G43.009 MIGRAINE WITHOUT AURA AND WITHOUT STATUS MIGRAINOSUS, NOT INTRACTABLE: ICD-10-CM

## 2025-04-30 DIAGNOSIS — I10 ESSENTIAL HYPERTENSION: ICD-10-CM

## 2025-05-01 RX ORDER — RIZATRIPTAN BENZOATE 10 MG/1
10 TABLET ORAL AS NEEDED
Qty: 90 TABLET | Refills: 0 | Status: SHIPPED | OUTPATIENT
Start: 2025-05-01

## 2025-05-02 RX ORDER — LISINOPRIL 10 MG/1
10 TABLET ORAL DAILY
Qty: 90 TABLET | Refills: 0 | Status: SHIPPED | OUTPATIENT
Start: 2025-05-02

## 2025-05-07 DIAGNOSIS — G43.009 MIGRAINE WITHOUT AURA AND WITHOUT STATUS MIGRAINOSUS, NOT INTRACTABLE: ICD-10-CM

## 2025-05-08 RX ORDER — RIZATRIPTAN BENZOATE 10 MG/1
10 TABLET ORAL AS NEEDED
Qty: 90 TABLET | Refills: 0 | Status: SHIPPED | OUTPATIENT
Start: 2025-05-08

## 2025-06-12 DIAGNOSIS — I10 ESSENTIAL HYPERTENSION: ICD-10-CM

## 2025-06-12 RX ORDER — AMLODIPINE BESYLATE 5 MG/1
5 TABLET ORAL DAILY
Qty: 90 TABLET | Refills: 3 | Status: SHIPPED | OUTPATIENT
Start: 2025-06-12

## 2025-07-01 DIAGNOSIS — G43.009 MIGRAINE WITHOUT AURA AND WITHOUT STATUS MIGRAINOSUS, NOT INTRACTABLE: ICD-10-CM

## 2025-07-03 RX ORDER — RIZATRIPTAN BENZOATE 10 MG/1
TABLET ORAL
Qty: 90 TABLET | Refills: 0 | OUTPATIENT
Start: 2025-07-03

## 2025-08-12 ENCOUNTER — OFFICE VISIT (OUTPATIENT)
Dept: FAMILY MEDICINE CLINIC | Facility: CLINIC | Age: 35
End: 2025-08-12
Payer: COMMERCIAL